# Patient Record
Sex: MALE | Race: WHITE | NOT HISPANIC OR LATINO | Employment: FULL TIME | URBAN - METROPOLITAN AREA
[De-identification: names, ages, dates, MRNs, and addresses within clinical notes are randomized per-mention and may not be internally consistent; named-entity substitution may affect disease eponyms.]

---

## 2017-10-06 ENCOUNTER — HOSPITAL ENCOUNTER (EMERGENCY)
Facility: CLINIC | Age: 52
Discharge: LEFT BEFORE DISCHARGE | End: 2017-10-06
Attending: EMERGENCY MEDICINE
Payer: COMMERCIAL

## 2017-10-06 ENCOUNTER — NURSE TRIAGE (OUTPATIENT)
Dept: NURSING | Facility: CLINIC | Age: 52
End: 2017-10-06

## 2017-10-06 NOTE — TELEPHONE ENCOUNTER
Reason for Disposition    [1] Looks infected AND [2] large red area (> 2 in. or 5 cm)    Additional Information    Negative: [1] Life-threatening reaction (anaphylaxis) in the past to similar substance (e.g., food, insect bite/sting, chemical, etc.) AND [2] < 2 hours since exposure    Negative: Unresponsive, passed out or very weak    Negative: Swollen tongue    Negative: Difficulty breathing or wheezing    Negative: Sounds like a life-threatening emergency to the triager    Negative: Followed a face injury    Negative: [1] Bee sting AND [2] within last 24 hours    Negative: Insect bite suspected    Negative: Swelling mainly of lip(s)    Negative: Swelling mainly around the eyes    Negative: [1] SEVERE swelling of entire face AND [2] < 2 hours since exposure to high-risk allergen (e.g., peanuts, tree nuts, fish, shellfish or 1st dose of drug) AND [3] no serious symptoms AND [4] no serious allergic reaction in the past    Negative: Fever     Left cheek swelling.    Negative: Taking an ACE Inhibitor medication  (e.g., benazepril/LOTENSIN, captopril/CAPOTEN, enalapril/VASOTEC, lisinopril/ZESTRIL)    Negative: Patient sounds very sick or weak to the triager    Negative: Pregnant > 20 weeks    Negative: Postpartum (i.e. < 1 month since delivery)    Negative: SEVERE swelling of the entire face    Negative: [1] Swelling is red AND [2] very painful to touch    Negative: Swelling began after taking a drug    Protocols used: FACE SWELLING-ADULT-

## 2017-10-18 ASSESSMENT — ACTIVITIES OF DAILY LIVING (ADL)
ARE_THERE_FIREARMS_IN_YOUR_HOME?: N
DO_MEMBERS_OF_YOUR_HOUSEHOLD_USE_SUNSCREEN?: Y
DO_MEMBERS_OF_YOUR_HOUSEHOLD_USE_SAFETY_HELMETS?: Y
ARE_THERE_SMOKE_DETECTORS_IN_YOUR_HOME?: Y
DO_MEMBERS_OF_YOUR_HOUSEHOLD_WEAR_SEAT_BELTS?: Y
ARE_THERE_CARBON_MONOXIDE_DETECTORS_IN_YOUR_HOME?: Y

## 2017-11-01 ENCOUNTER — OFFICE VISIT (OUTPATIENT)
Dept: INTERNAL MEDICINE | Facility: CLINIC | Age: 52
End: 2017-11-01

## 2017-11-01 VITALS
HEIGHT: 71 IN | SYSTOLIC BLOOD PRESSURE: 126 MMHG | BODY MASS INDEX: 26.38 KG/M2 | DIASTOLIC BLOOD PRESSURE: 81 MMHG | HEART RATE: 86 BPM | WEIGHT: 188.4 LBS

## 2017-11-01 DIAGNOSIS — K21.9 GASTROESOPHAGEAL REFLUX DISEASE WITHOUT ESOPHAGITIS: ICD-10-CM

## 2017-11-01 DIAGNOSIS — I10 BENIGN ESSENTIAL HYPERTENSION: ICD-10-CM

## 2017-11-01 DIAGNOSIS — I10 ESSENTIAL HYPERTENSION, BENIGN: ICD-10-CM

## 2017-11-01 DIAGNOSIS — I10 BENIGN ESSENTIAL HYPERTENSION: Primary | ICD-10-CM

## 2017-11-01 LAB
ANION GAP SERPL CALCULATED.3IONS-SCNC: 5 MMOL/L (ref 3–14)
BUN SERPL-MCNC: 18 MG/DL (ref 7–30)
CALCIUM SERPL-MCNC: 8.4 MG/DL (ref 8.5–10.1)
CHLORIDE SERPL-SCNC: 105 MMOL/L (ref 94–109)
CHOLEST SERPL-MCNC: 129 MG/DL
CO2 SERPL-SCNC: 27 MMOL/L (ref 20–32)
CREAT SERPL-MCNC: 0.92 MG/DL (ref 0.66–1.25)
GFR SERPL CREATININE-BSD FRML MDRD: 87 ML/MIN/1.7M2
GLUCOSE SERPL-MCNC: 84 MG/DL (ref 70–99)
HDLC SERPL-MCNC: 28 MG/DL
LDLC SERPL CALC-MCNC: 91 MG/DL
NONHDLC SERPL-MCNC: 101 MG/DL
POTASSIUM SERPL-SCNC: 4.3 MMOL/L (ref 3.4–5.3)
SODIUM SERPL-SCNC: 138 MMOL/L (ref 133–144)
TRIGL SERPL-MCNC: 51 MG/DL

## 2017-11-01 RX ORDER — LISINOPRIL 20 MG/1
TABLET ORAL
Qty: 90 TABLET | Refills: 3 | Status: SHIPPED | OUTPATIENT
Start: 2017-11-01 | End: 2018-10-30

## 2017-11-01 ASSESSMENT — ENCOUNTER SYMPTOMS
INSOMNIA: 0
HEMATURIA: 0
STIFFNESS: 0
RECTAL PAIN: 0
VOMITING: 0
ALTERED TEMPERATURE REGULATION: 0
POSTURAL DYSPNEA: 0
RECTAL BLEEDING: 0
BRUISES/BLEEDS EASILY: 0
PALPITATIONS: 0
DEPRESSION: 0
WEAKNESS: 0
SMELL DISTURBANCE: 0
SEIZURES: 0
FATIGUE: 0
BLOOD IN STOOL: 0
SWOLLEN GLANDS: 0
DYSPNEA ON EXERTION: 0
DIFFICULTY URINATING: 0
EXTREMITY NUMBNESS: 0
BACK PAIN: 0
ABDOMINAL PAIN: 0
EYE IRRITATION: 0
SINUS PAIN: 0
DYSURIA: 0
FEVER: 0
MUSCLE CRAMPS: 0
TROUBLE SWALLOWING: 0
DECREASED APPETITE: 0
JAUNDICE: 0
HEARTBURN: 0
SLEEP DISTURBANCES DUE TO BREATHING: 0
EYE WATERING: 0
DIARRHEA: 0
SNORES LOUDLY: 0
EYE PAIN: 0
INCREASED ENERGY: 0
ARTHRALGIAS: 0
LEG SWELLING: 0
ORTHOPNEA: 0
MEMORY LOSS: 0
WEIGHT LOSS: 0
TINGLING: 0
BLOATING: 0
CHILLS: 0
NAIL CHANGES: 0
SINUS CONGESTION: 0
HOARSE VOICE: 0
HALLUCINATIONS: 0
NIGHT SWEATS: 0
POLYPHAGIA: 0
LEG PAIN: 0
HYPOTENSION: 0
BOWEL INCONTINENCE: 0
POLYDIPSIA: 0
SYNCOPE: 0
NERVOUS/ANXIOUS: 0
HEMOPTYSIS: 0
FLANK PAIN: 0
POOR WOUND HEALING: 0
EXERCISE INTOLERANCE: 0
NECK MASS: 0
NUMBNESS: 0
HYPERTENSION: 0
RESPIRATORY PAIN: 0
TACHYCARDIA: 0
CLAUDICATION: 0
HEADACHES: 0
CONSTIPATION: 0
SKIN CHANGES: 0
SPUTUM PRODUCTION: 0
DISTURBANCES IN COORDINATION: 0
SORE THROAT: 0
COUGH DISTURBING SLEEP: 0
LOSS OF CONSCIOUSNESS: 0
JOINT SWELLING: 0
NAUSEA: 0
PANIC: 0
TASTE DISTURBANCE: 0
DOUBLE VISION: 0
DECREASED CONCENTRATION: 0
DIZZINESS: 0
TREMORS: 0
MYALGIAS: 0
NECK PAIN: 0
PARALYSIS: 0
SHORTNESS OF BREATH: 0
SPEECH CHANGE: 0
MUSCLE WEAKNESS: 0
WEIGHT GAIN: 0
WHEEZING: 0
COUGH: 0
EYE REDNESS: 0
LIGHT-HEADEDNESS: 0

## 2017-11-01 ASSESSMENT — PAIN SCALES - GENERAL: PAINLEVEL: NO PAIN (0)

## 2017-11-01 NOTE — MR AVS SNAPSHOT
After Visit Summary   11/1/2017    VÍCTOR MACIEL    MRN: [id number]           Patient Information     Date Of Birth          1965        Visit Information        Provider Department      11/1/2017 7:40 AM Garcia Mario MD [facility]        Today's Diagnoses     Benign essential hypertension    -  1    Essential hypertension, benign        Gastroesophageal reflux disease without esophagitis          Care Instructions    Primary Care Center: [phone]     Primary Care Center Medication Refill Request Information:  * Please contact your pharmacy regarding ANY request for medication refills.  ** [facility] Prescription Fax = [phone]  * Please allow 3 business days for routine medication refills.  * Please allow 5 business days for controlled substance medication refills.     Primary Care Center Test Result notification information:  *You will be notified with in 7-10 days of your appointment day regarding the results of your test.  If you are on MyChart you will be notified as soon as the provider has reviewed the results and signed off on them.            Follow-ups after your visit        Who to contact     Please call your clinic at [phone] to:    Ask questions about your health    Make or cancel appointments    Discuss your medicines    Learn about your test results    Speak to your doctor   If you have compliments or concerns about an experience at your clinic, or if you wish to file a complaint, please contact [facility] Patient Relations at [phone] or email us at [email or web address]         Additional Information About Your Visit        MyChart Information     MyChart gives you secure access to your electronic health record. If you see a primary care provider, you can also send messages to your care team and make appointments. If you have questions, please call your primary care clinic.  If you do not have a  "primary care provider, please call [phone] and they will assist you.      [employer] is an electronic gateway that provides easy, online access to your medical records. With [employer], you can request a clinic appointment, read your test results, renew a prescription or communicate with your care team.     To access your existing account, please contact your [facility] or call [phone] for assistance.        Care EveryWhere ID     This is your Care EveryWhere ID. This could be used by other organizations to access your [place] medical records  [id number]        Your Vitals Were     Pulse Height BMI (Body Mass Index)             86 1.803 m (5' 11\") 26.28 kg/m2          Blood Pressure from Last 3 Encounters:   11/01/17 126/81   10/31/16 126/84   10/29/15 124/82    Weight from Last 3 Encounters:   11/01/17 85.5 kg (188 lb 6.4 oz)   10/31/16 85.4 kg (188 lb 3.2 oz)   10/29/15 83 kg (183 lb)                 Today's Medication Changes          These changes are accurate as of: 11/1/17 11:59 PM.  If you have any questions, ask your nurse or doctor.               Start taking these medicines.        Dose/Directions    ranitidine 300 MG tablet   Commonly known as:  ZANTAC   Used for:  Gastroesophageal reflux disease without esophagitis   Started by:  Garcia Mario MD        Dose:  300 mg   Take 1 tablet (300 mg) by mouth At Bedtime   Quantity:  100 tablet   Refills:  3            Where to get your medicines      These medications were sent to [facility] [address] [place] [address] [address] [facility]  [address], [facility] [address]    Hours:  24-hours Phone:  [phone]     lisinopril 20 MG tablet    ranitidine 300 MG tablet                Primary Care Provider Office Phone # Fax #    Yuly TONY Saenz [phone]       [address] [place] [address]  [facility] [address]        Equal Access to Services     FIIRO " GAAR : Hadii devonte frazier rosangela Guerrero, waroqueda luqadaha, qaybta kasha walsh, waxgiuseppe josie hayslime fagandelroyjeancarlos landon. So [facility] [phone].    ATENCIÓN: Si habla español, tiene a choudhury disposición servicios gratuitos de asistencia lingüística. Llame al [phone].    We comply with applicable federal civil rights laws and [place] laws. We do not discriminate on the basis of race, color, national origin, age, disability, sex, sexual orientation, or gender identity.            Thank you!     Thank you for choosing [facility]  for your care. Our goal is always to provide you with excellent care. Hearing back from our patients is one way we can continue to improve our services. Please take a few minutes to complete the written survey that you may receive in the mail after your visit with us. Thank you!             Your Updated Medication List - Protect others around you: Learn how to safely use, store and throw away your medicines at www.disposemymeds.org.          This list is accurate as of: 11/1/17 11:59 PM.  Always use your most recent med list.                   Brand Name Dispense Instructions for use Diagnosis    lisinopril 20 MG tablet    PRINIVIL/ZESTRIL    90 tablet    Take one tab daily.    Essential hypertension, benign       multivitamin per tablet     100    ONE DAILY        ranitidine 300 MG tablet    ZANTAC    100 tablet    Take 1 tablet (300 mg) by mouth At Bedtime    Gastroesophageal reflux disease without esophagitis       vitamin D 1000 UNITS capsule      Take 2 capsules by mouth daily.

## 2017-11-01 NOTE — NURSING NOTE
Chief Complaint   Patient presents with     Physical     pt here for physical     Medication Request     pt would like to discuss medication refill     Rosina Espinoza CMA at 7:36 AM on 11/1/2017.

## 2017-11-01 NOTE — PATIENT INSTRUCTIONS
[facility]: [phone]     [facility] Medication Refill Request Information:  * Please contact your pharmacy regarding ANY request for medication refills.  ** [facility] Prescription Fax = [phone]  * Please allow 3 business days for routine medication refills.  * Please allow 5 business days for controlled substance medication refills.     [facility] Test Result notification information:  *You will be notified with in 7-10 days of your appointment day regarding the results of your test.  If you are on MyChart you will be notified as soon as the provider has reviewed the results and signed off on them.

## 2017-11-01 NOTE — PROGRESS NOTES
PRIMARY CARE CENTER         HPI:       VÍCTOR MACIEL is a 52 year old male with PMHx of HTN who presents for his annual physical exam.     Patient presents with: Physical (pt here for physical) and Medication Request (pt would like to discuss medication refill)    Feels good, had a good year, no major complaints. He is still exercising 3-4 times per week. He checks his blood pressure once per month, and typically it is a little less than 120/80.    He does not have SOB or chest pain on exertion. He is not concerned about any moles, his mood has been good, no abdominal concerns or changes in bowel habits. Works at [employer], professor, and does research in new antivirals. He is eating a healthy diet low in meat and high in vegetables and fuits. Visiting the dentist twice per year. He recently visited his endodontist for a cracked tooth that aggravated a past root canal.     He may have allergies or a cold coming on, he is unsure. He thinks he is getting allergies. He has a bit of a post-nasal drip that is intermittent and chronic. Once in awhile he may have a cough, and he had a little bit of sneezing yesterday. He notices some reflux/heartburn at times with the post-nasal drip. It will particularly occur at night especially if he eats late or in the day time if he drinks too much coffee. He does not take an antacid, but he will take one or two tums once in awhile.     Problem, Medication and Allergy Lists were reviewed and are current.  Patient is an established patient of this clinic.         Review of Systems:   Review of Systems     Constitutional:  Negative for fever, chills, weight loss, weight gain, fatigue, decreased appetite, night sweats, recent stressors, height gain, height loss, post-operative complications, incisional pain, hallucinations, increased energy, hyperactivity and confused.   HENT:  Negative for ear pain, hearing loss, tinnitus, nosebleeds, trouble swallowing, hoarse voice, mouth sores,  sore throat, ear discharge, tooth pain, gum tenderness, taste disturbance, smell disturbance, hearing aid, bleeding gums, dry mouth, sinus pain, sinus congestion and neck mass.    Eyes:  Negative for double vision, pain, redness, eye pain, decreased vision, eye watering, eye bulging, eye dryness, flashing lights, spots, floaters, strabismus, tunnel vision, jaundice and eye irritation.   Respiratory:   Negative for cough, hemoptysis, sputum production, shortness of breath, wheezing, sleep disturbances due to breathing, snores loudly, respiratory pain, dyspnea on exertion, cough disturbing sleep and postural dyspnea.    Cardiovascular:  Negative for chest pain, dyspnea on exertion, palpitations, orthopnea, claudication, leg swelling, fingers/toes turn blue, hypertension, hypotension, syncope, history of heart murmur, chest pain on exertion, chest pain at rest, pacemaker, few scattered varicosities, leg pain, sleep disturbances due to breathing, tachycardia, light-headedness, exercise intolerance and edema.   Gastrointestinal:  Negative for heartburn, nausea, vomiting, abdominal pain, diarrhea, constipation, blood in stool, melena, rectal pain, bloating, hemorrhoids, bowel incontinence, jaundice, rectal bleeding, coffee ground emesis and change in stool.   Genitourinary:  Negative for bladder incontinence, dysuria, urgency, hematuria, flank pain, difficulty urinating, nocturia, voiding less frequently, scrotal pain, ulcerations, penile discharge, male genitourinary complaint and reduced libido.   Musculoskeletal:  Negative for myalgias, back pain, joint swelling, arthralgias, stiffness, muscle cramps, neck pain, bone pain, muscle weakness and fracture.   Skin:  Negative for nail changes, itching, poor wound healing, rash, hair changes, skin changes, acne, warts, poor wound healing, scarring, flaky skin, Raynaud's phenomenon, sensitivity to sunlight and skin thickening.   Neurological:  Negative for dizziness, tingling,  "tremors, speech change, seizures, loss of consciousness, weakness, light-headedness, numbness, headaches, disturbances in coordination, extremity numbness, memory loss, difficulty walking and paralysis.   Endo/Heme:  Negative for anemia, swollen glands and bruises/bleeds easily.   Psychiatric/Behavioral:  Negative for depression, hallucinations, memory loss, decreased concentration, mood swings and panic attacks.    Endocrine:  Negative for altered temperature regulation, polyphagia, polydipsia, unwanted hair growth and change in facial hair.    I have personally reviewed and updated the complete ROS on the day of the visit.           Physical Exam:   /81  Pulse 86  Ht 1.803 m (5' 11\")  Wt 85.5 kg (188 lb 6.4 oz)  BMI 26.28 kg/m2  Body mass index is 26.28 kg/(m^2).  Vitals were reviewed        GENERAL APPEARANCE: healthy, alert and no distress     EYES: PERRL, no scleral icterus     HENT: ear canals and TM's normal and nose and mouth without ulcers or lesions     NECK: normal ROM     RESP: lungs clear to auscultation - no rales, rhonchi or wheezes     CV: regular rates and rhythm, normal S1 S2, no S3 or S4 and no murmur, click or rub     ABDOMEN:  soft, nontender, no HSM or masses and bowel sounds normal     MS: extremities normal- no gross deformities noted, no evidence of inflammation in joints, FROM in all extremities. No swelling in the LE's.     : prostate slightly enlarged, no nodules palpated     SKIN: no suspicious lesions or rashes     NEURO: Normal strength and tone, sensory exam grossly normal, mentation intact and speech normal     PSYCH: mentation appears normal. and affect normal/bright      Results:   No recent laboratory results to review.     Assessment and Plan   VÍCTOR MACIEL is a 52 year old male with PMHx of HTN who presents for his annual physical exam.    #Recurrent post-nasal drip with some symptomatic heart burn  -possible that this patient's post-nasal drip is 2/2 silent GERD " that becomes symptomatic from time to time  -pt will intermittently notice cough  Plan: begin ranitidine 300 mg, once daily; pt instructed to try for a couple of months and then see if it helps    #benign Essential HTN, well-controlled on lisinopril, 20 mg  Plan: continue on lisinopril 20 mg, will provide refill today; BMP check today WNL except Ca, borderline low at 8.4    #Health Maintenance  -He has already had his annual flu vaccine this year  -Had 1st colonoscopy in 2016 that was normal. Next one due 2026.   -lipid profile today shows normal LDL, TG and cholesterol, HDL is low at 28    Options for treatment and follow-up care were reviewed with the patient. VÍCTOR MACIEL engaged in the decision making process and verbalized understanding of the options discussed and agreed with the final plan.    Winnie Oliva MD  Nov 1, 2017    Pt was seen and plan of care discussed with Dr. Mario.       Answers for HPI/ROS submitted by the patient on 10/18/2017   Annual Exam:  Frequency of exercise:: 4-5 days/week  Duration of exercise:: 30-45 minutes    Pt was seen and examined with Dr. Oliva.  I agree with her documentation as noted above.    My additional comments: None    Garcia Mario

## 2018-10-30 DIAGNOSIS — I10 ESSENTIAL HYPERTENSION, BENIGN: ICD-10-CM

## 2018-10-31 RX ORDER — LISINOPRIL 20 MG/1
TABLET ORAL
Qty: 90 TABLET | Refills: 0 | Status: SHIPPED | OUTPATIENT
Start: 2018-10-31 | End: 2018-11-13

## 2018-10-31 NOTE — TELEPHONE ENCOUNTER
Last Clinic Visit: 11/1/2017  [facility]  Future Visit: 11/13/18

## 2018-11-13 ENCOUNTER — OFFICE VISIT (OUTPATIENT)
Dept: INTERNAL MEDICINE | Facility: CLINIC | Age: 53
End: 2018-11-13
Payer: COMMERCIAL

## 2018-11-13 VITALS
WEIGHT: 193.1 LBS | HEART RATE: 90 BPM | SYSTOLIC BLOOD PRESSURE: 119 MMHG | DIASTOLIC BLOOD PRESSURE: 80 MMHG | HEIGHT: 71 IN | BODY MASS INDEX: 27.03 KG/M2

## 2018-11-13 DIAGNOSIS — I10 ESSENTIAL HYPERTENSION, BENIGN: ICD-10-CM

## 2018-11-13 DIAGNOSIS — E78.6 LOW HDL (UNDER 40): ICD-10-CM

## 2018-11-13 DIAGNOSIS — K21.9 GASTROESOPHAGEAL REFLUX DISEASE WITHOUT ESOPHAGITIS: Primary | ICD-10-CM

## 2018-11-13 DIAGNOSIS — K21.9 GASTROESOPHAGEAL REFLUX DISEASE WITHOUT ESOPHAGITIS: ICD-10-CM

## 2018-11-13 LAB
ALBUMIN SERPL-MCNC: 4 G/DL (ref 3.4–5)
ALP SERPL-CCNC: 98 U/L (ref 40–150)
ALT SERPL W P-5'-P-CCNC: 53 U/L (ref 0–70)
ANION GAP SERPL CALCULATED.3IONS-SCNC: 3 MMOL/L (ref 3–14)
AST SERPL W P-5'-P-CCNC: 33 U/L (ref 0–45)
BILIRUB SERPL-MCNC: 0.4 MG/DL (ref 0.2–1.3)
BUN SERPL-MCNC: 18 MG/DL (ref 7–30)
CALCIUM SERPL-MCNC: 8.6 MG/DL (ref 8.5–10.1)
CHLORIDE SERPL-SCNC: 105 MMOL/L (ref 94–109)
CHOLEST SERPL-MCNC: 161 MG/DL
CO2 SERPL-SCNC: 29 MMOL/L (ref 20–32)
CREAT SERPL-MCNC: 0.99 MG/DL (ref 0.66–1.25)
ERYTHROCYTE [DISTWIDTH] IN BLOOD BY AUTOMATED COUNT: 11.9 % (ref 10–15)
GFR SERPL CREATININE-BSD FRML MDRD: 79 ML/MIN/1.7M2
GLUCOSE SERPL-MCNC: 89 MG/DL (ref 70–99)
HCT VFR BLD AUTO: 43.6 % (ref 40–53)
HDLC SERPL-MCNC: 31 MG/DL
HGB BLD-MCNC: 14.6 G/DL (ref 13.3–17.7)
LDLC SERPL CALC-MCNC: 108 MG/DL
MCH RBC QN AUTO: 32.4 PG (ref 26.5–33)
MCHC RBC AUTO-ENTMCNC: 33.5 G/DL (ref 31.5–36.5)
MCV RBC AUTO: 97 FL (ref 78–100)
NONHDLC SERPL-MCNC: 130 MG/DL
PLATELET # BLD AUTO: 240 10E9/L (ref 150–450)
POTASSIUM SERPL-SCNC: 4.3 MMOL/L (ref 3.4–5.3)
PROT SERPL-MCNC: 7.3 G/DL (ref 6.8–8.8)
RBC # BLD AUTO: 4.5 10E12/L (ref 4.4–5.9)
SODIUM SERPL-SCNC: 137 MMOL/L (ref 133–144)
TRIGL SERPL-MCNC: 109 MG/DL
WBC # BLD AUTO: 5.1 10E9/L (ref 4–11)

## 2018-11-13 RX ORDER — OMEPRAZOLE 40 MG/1
40 CAPSULE, DELAYED RELEASE ORAL DAILY
Qty: 100 CAPSULE | Refills: 3 | Status: SHIPPED | OUTPATIENT
Start: 2018-11-13 | End: 2019-11-13

## 2018-11-13 RX ORDER — LISINOPRIL 20 MG/1
TABLET ORAL
Qty: 100 TABLET | Refills: 3 | Status: SHIPPED | OUTPATIENT
Start: 2018-11-13 | End: 2019-11-13

## 2018-11-13 ASSESSMENT — PAIN SCALES - GENERAL: PAINLEVEL: NO PAIN (0)

## 2018-11-13 NOTE — NURSING NOTE
Chief Complaint   Patient presents with     Physical     pt here for physical     Medication Request     pt would like medication refills       Rosina Espinzoa CMA at 7:39 AM on 11/13/2018.

## 2018-11-13 NOTE — MR AVS SNAPSHOT
After Visit Summary   11/13/2018    VÍCTOR MACIEL    MRN: [id number]           Patient Information     Date Of Birth          1965        Visit Information        Provider Department      11/13/2018 7:40 AM Garcia Mario MD [facility]        Today's Diagnoses     Gastroesophageal reflux disease without esophagitis    -  1    Essential hypertension, benign        Low HDL (under 40)          Care Instructions    Primary Care Center Medication Refill Request Information:  * Please contact your pharmacy regarding ANY request for medication refills.  ** [facility] Prescription Fax = [phone]  * Please allow 3 business days for routine medication refills.  * Please allow 5 business days for controlled substance medication refills.     Primary Care Center Test Result notification information:  *You will be notified with in 7-10 days of your appointment day regarding the results of your test.  If you are on MyChart you will be notified as soon as the provider has reviewed the results and signed off on them.    [facility]: [phone]             Follow-ups after your visit        Future tests that were ordered for you today     Open Future Orders        Priority Expected Expires Ordered    Lipid Profile Routine  11/13/2019 11/13/2018    Comprehensive metabolic panel Routine  11/13/2019 11/13/2018    CBC with platelets Routine 11/13/2018 11/27/2018 11/13/2018            Who to contact     Please call your clinic at [phone] to:    Ask questions about your health    Make or cancel appointments    Discuss your medicines    Learn about your test results    Speak to your doctor            Additional Information About Your Visit        MyChart Information     MyChart gives you secure access to your electronic health record. If you see a primary care provider, you can also send messages to your care team and make appointments. If you have questions, please call your  "primary care clinic.  If you do not have a primary care provider, please call [phone] and they will assist you.      [employer] is an electronic gateway that provides easy, online access to your medical records. With [employer], you can request a clinic appointment, read your test results, renew a prescription or communicate with your care team.     To access your existing account, please contact your [facility] or call [phone] for assistance.        Care EveryWhere ID     This is your Care EveryWhere ID. This could be used by other organizations to access your [place] [address] medical records  [id number]        Your Vitals Were     Pulse Height BMI (Body Mass Index)             90 1.81 m (5' 11.26\") 26.74 kg/m2          Blood Pressure from Last 3 Encounters:   11/13/18 119/80   11/01/17 126/81   10/31/16 126/84    Weight from Last 3 Encounters:   11/13/18 87.6 kg (193 lb 1.6 oz)   11/01/17 85.5 kg (188 lb 6.4 oz)   10/31/16 85.4 kg (188 lb 3.2 oz)                 Today's Medication Changes          These changes are accurate as of 11/13/18  8:25 AM.  If you have any questions, ask your nurse or doctor.               Start taking these medicines.        Dose/Directions    omeprazole 40 MG capsule   Commonly known as:  priLOSEC   Used for:  Gastroesophageal reflux disease without esophagitis   Started by:  Garcia Mario MD        Dose:  40 mg   Take 1 capsule (40 mg) by mouth daily   Quantity:  100 capsule   Refills:  3         Stop taking these medicines if you haven't already. Please contact your care team if you have questions.     ranitidine 300 MG tablet   Commonly known as:  ZANTAC   Stopped by:  Garcia Mario MD                Where to get your medicines      [facility] [address] [place] [address] [address] [facility] [place] [address] [place] [address]    Hours:  24-hours Phone:  " [phone]     lisinopril 20 MG tablet    omeprazole 40 MG capsule                Primary Care Provider Office Phone # Fax #    aGrcia Mario [phone]       [address] [place] [id number]        Equal Access to Services     ISIDRO SHELL : Hadii devonte ku hadasho Soomaali, waaxda luqadaha, qaybta kaalmada adeegyada, waxay bradyin haypeteyn katheryn youngjeancarlos landon. So [facility] [phone].    ATENCIÓN: Si habla español, tiene a choudhury disposición servicios gratuitos de asistencia lingüística. [facility] [phone].    We comply with applicable federal civil rights laws and [place] laws. We do not discriminate on the basis of race, color, national origin, age, disability, sex, sexual orientation, or gender identity.            Thank you!     Thank you for choosing [facility]  for your care. Our goal is always to provide you with excellent care. Hearing back from our patients is one way we can continue to improve our services. Please take a few minutes to complete the written survey that you may receive in the mail after your visit with us. Thank you!             Your Updated Medication List - Protect others around you: Learn how to safely use, store and throw away your medicines at www.disposemymeds.org.          This list is accurate as of 11/13/18  8:25 AM.  Always use your most recent med list.                   Brand Name Dispense Instructions for use Diagnosis    lisinopril 20 MG tablet    PRINIVIL/ZESTRIL    100 tablet    Take one tab daily.    Essential hypertension, benign       multivitamin per tablet     100    ONE DAILY        omeprazole 40 MG capsule    priLOSEC    100 capsule    Take 1 capsule (40 mg) by mouth daily    Gastroesophageal reflux disease without esophagitis       vitamin D 1000 units capsule      Take 2 capsules by mouth daily.

## 2018-11-13 NOTE — PATIENT INSTRUCTIONS
[facility] Medication Refill Request Information:  * Please contact your pharmacy regarding ANY request for medication refills.  ** [facility] Prescription Fax = [phone]  * Please allow 3 business days for routine medication refills.  * Please allow 5 business days for controlled substance medication refills.     [facility] Test Result notification information:  *You will be notified with in 7-10 days of your appointment day regarding the results of your test.  If you are on MyChart you will be notified as soon as the provider has reviewed the results and signed off on them.    [facility]: [phone]

## 2018-11-13 NOTE — PROGRESS NOTES
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  He is been doing quite well.  He is working out 3-4 days a week is tolerating this well without chest pain dyspnea or other complaints.  He has had no associated dizziness lightheadedness he said no problems on his lisinopril his blood pressures been under good control.  He is eating a healthy diet with good intake of fruits and vegetables.  Otherwise he has no specific concerns other than reflux.  He is been taking the ranitidine on a somewhat regular basis and noted that this has some improvement in the reflux symptoms but not resolution.  He will still get reflux particularly in the gym when he is exercising.  He has not had any dysphasia or food getting stuck.  He is cut out coffee and this too has helped somewhat regards to the reflux symptoms although he does not enjoy drinking tea as much.  He has never tried a PPI we discussed the risk and benefits of this and he consented to take omeprazole.  Past Medical History:   Diagnosis Date     Hypertension      Past Surgical History:   Procedure Laterality Date     NO HISTORY OF SURGERY       Family History   Problem Relation Age of Onset     Hypertension Father      Alcohol/Drug Father      Diabetes Father      Hypertension Mother      GASTROINTESTINAL DISEASE Brother      franki     Diabetes Paternal Grandfather      Social History     Social History     Marital status:      Spouse name: N/A     Number of children: 2     Years of education: N/A     Occupational History     Microbiologist [employer]     Social History Main Topics     Smoking status: Never Smoker     Smokeless tobacco: Former User     Types: Chew     Quit date: 10/29/2012      Comment: Pt states he chews every once in a while during fishing     Alcohol use 0.0 oz/week     0 Standard drinks or equivalent per week      Comment: 0-1 drinks/day     Drug use: No     Sexual activity: Yes     Partners: Female  "    Other Topics Concern      Service No     Blood Transfusions No     Caffeine Concern No     Occupational Exposure Yes     works with viruses     Hobby Hazards No     Sleep Concern No     Stress Concern Yes     does meditation daily, exercises     Weight Concern No     Special Diet No     Back Care No     Exercise Yes     3x week 60 min weight training/cardio     Bike Helmet Yes     Seat Belt Yes     Self-Exams Yes     Social History Narrative    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  He has an 8 yr. Old son and 5 year old daughter.  Wife is also employed.       Answers for HPI/ROS submitted by the patient on 11/6/2018   General Symptoms: No  Skin Symptoms: No  HENT Symptoms: No  EYE SYMPTOMS: No  HEART SYMPTOMS: No  LUNG SYMPTOMS: No  INTESTINAL SYMPTOMS: No  URINARY SYMPTOMS: No  REPRODUCTIVE SYMPTOMS: No  SKELETAL SYMPTOMS: No  BLOOD SYMPTOMS: No  NERVOUS SYSTEM SYMPTOMS: No  MENTAL HEALTH SYMPTOMS: No    Exam:  /80  Pulse 90  Ht 1.81 m (5' 11.26\")  Wt 87.6 kg (193 lb 1.6 oz)  BMI 26.74 kg/m2  193 lbs 1.6 oz  Physical Exam   The patient is alert, oriented with a clear sensorium.   Skin shows no lesions or rashes and good turgor.   Head is normocephalic and atraumatic.   Eyes show PERRLA with benign optic fundi.   Ears show normal TMs bilaterally.   Mouth shows clear oral mucosa.   Neck shows no nodes, thyromegaly or bruits.   Back is non tender.  Lungs are clear to percussion and auscultation.   Heart shows normal S1 and S2 without murmur or gallop.   Abdomen is soft and nontender without masses or organomegaly.   Genitalia show normal testes. No evidence of inguinal hernia.  Rectal shows small smooth prostate without nodules or masses.  Extremities show no edema and no evidence of active synovitis.   Neurologic examination shows cranial nerves II-XII intact. Motor shows 5/5 strength. Reflexes are symmetric. Cerebellar testing shows normal tandem gait.  Romberg " negative.    ASSESSMENT  1 hypertension well-controlled  2 GERD persistently symptomatic intermittently  3 low HDL needs follow-up    Plan  I am going to have him try omeprazole for 2 months 40 mg daily and then as needed.  Refilled his lisinopril will reassess his labs today have him follow-up in a year sooner immediately if any increased symptoms or problems    This note was completed using Dragon voice recognition software.  Although reviewed after completion, some word and grammatical errors may occur.    Garcia Mario MD  General Internal Medicine  [facility]  [phone]

## 2018-12-27 ENCOUNTER — OFFICE VISIT (OUTPATIENT)
Dept: INTERNAL MEDICINE | Facility: CLINIC | Age: 53
End: 2018-12-27
Payer: COMMERCIAL

## 2018-12-27 VITALS
WEIGHT: 193 LBS | SYSTOLIC BLOOD PRESSURE: 133 MMHG | RESPIRATION RATE: 18 BRPM | HEART RATE: 96 BPM | BODY MASS INDEX: 26.72 KG/M2 | DIASTOLIC BLOOD PRESSURE: 79 MMHG

## 2018-12-27 DIAGNOSIS — L42 PITYRIASIS ROSEA: Primary | ICD-10-CM

## 2018-12-27 ASSESSMENT — PAIN SCALES - GENERAL: PAINLEVEL: NO PAIN (0)

## 2018-12-27 NOTE — NURSING NOTE
Chief Complaint   Patient presents with     Derm Problem     Patient is here to discuss rash on body, duration 2 weeks     Jenna Menon CMA 3:12 PM on 12/27/2018.

## 2018-12-29 NOTE — PROGRESS NOTES
HPI: VÍCTOR MACIEL is a 53 year old male who comes in for evaluation of a trunk rash lasting 2 weeks.  It t started on his back and is mildly pruritic. The lesions have spread slightly to the upper and lower arms and thighs.  The lesions have not faded in color much. He denies any recent illness. He has been applying Eucerin cream and occasionally HCT cream.    Patient Active Problem List   Diagnosis     HTN (hypertension)     CARDIOVASCULAR SCREENING; LDL GOAL LESS THAN 130     Neoplasm of uncertain behavior of skin     Current Outpatient Medications   Medication Sig Dispense Refill     Cholecalciferol (VITAMIN D) 1000 UNITS capsule Take 2 capsules by mouth daily.       lisinopril (PRINIVIL/ZESTRIL) 20 MG tablet Take one tab daily. 100 tablet 3     MULTIVITAMINS OR TABS ONE DAILY 100 3     omeprazole (PRILOSEC) 40 MG capsule Take 1 capsule (40 mg) by mouth daily 100 capsule 3     ALLERGIES: Patient has no known allergies.    PAST MEDICAL HX:   Past Medical History:   Diagnosis Date     Hypertension      PAST SURGICAL HX:   Past Surgical History:   Procedure Laterality Date     NO HISTORY OF SURGERY       FAMILY HX:    Family History   Problem Relation Age of Onset     Hypertension Father      Alcohol/Drug Father      Diabetes Father      Hypertension Mother      Gastrointestinal Disease Brother         franki     Diabetes Paternal Grandfather        IMMUNIZATION HX:   Immunization History   Administered Date(s) Administered     HEPA 05/06/2009, 10/28/2014     Influenza (H1N1) 01/08/2010     Influenza (IIV3) PF 11/02/2012, 10/09/2013, 10/08/2014, 10/01/2015, 09/26/2017, 10/30/2018     Poliovirus, inactivated (IPV) 05/06/2009     TDAP Vaccine (Adacel) 05/06/2009     Tdap (Adacel,Boostrix) 08/25/2010     Typhoid IM 05/06/2009       SOCIAL HX:   Social History     Social History Narrative    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  Wife is also employed.          ROS  Negative ROS.    OBJECTIVE:  /79 (BP Location: Left arm, Patient Position: Sitting, Cuff Size: Adult Large)   Pulse 96   Resp 18   Wt 87.5 kg (193 lb)   BMI 26.72 kg/m     Wt Readings from Last 1 Encounters:   12/27/18 87.5 kg (193 lb)     Constitutional: no distress, comfortable, pleasant   Eyes: anicteric.   Cardiovascular: see VS  Respiratory: no distress.   Musculoskeletal: grossly normal  Skin: Oval, erythematous papules and small plaques are present on the back in a fir tree pattern and on abdomen.  There is a herald patch on the para L2 region.Scattered lighter colored plaques on inner upper arms and thighs.  Neurological: normal speech, no tremor. A and O x 3,  good historian.  Psychological: appropriate mood, good eye contact, normal affect.   .    ASSESSMENT/PLAN:  Pitariasis rosea  Advised of the natural progression and that it is self limited and lesions should dissipate in 6 weeks approx. He can take Benadryl for pruritis if needed.     Total time spent 25 minutes.  More than 50% of the time spent with Mr. MACIEL on counseling / coordinating his care.    Yuly JIMENEZ, CNP

## 2019-11-08 ENCOUNTER — HEALTH MAINTENANCE LETTER (OUTPATIENT)
Age: 54
End: 2019-11-08

## 2019-11-13 ENCOUNTER — OFFICE VISIT (OUTPATIENT)
Dept: INTERNAL MEDICINE | Facility: CLINIC | Age: 54
End: 2019-11-13
Payer: COMMERCIAL

## 2019-11-13 VITALS
BODY MASS INDEX: 25.62 KG/M2 | HEART RATE: 87 BPM | WEIGHT: 183 LBS | OXYGEN SATURATION: 98 % | DIASTOLIC BLOOD PRESSURE: 80 MMHG | RESPIRATION RATE: 16 BRPM | SYSTOLIC BLOOD PRESSURE: 121 MMHG | HEIGHT: 71 IN

## 2019-11-13 DIAGNOSIS — K21.9 GASTROESOPHAGEAL REFLUX DISEASE WITHOUT ESOPHAGITIS: ICD-10-CM

## 2019-11-13 DIAGNOSIS — I10 HYPERTENSION, UNSPECIFIED TYPE: Primary | ICD-10-CM

## 2019-11-13 DIAGNOSIS — I10 ESSENTIAL HYPERTENSION, BENIGN: ICD-10-CM

## 2019-11-13 DIAGNOSIS — I10 HYPERTENSION, UNSPECIFIED TYPE: ICD-10-CM

## 2019-11-13 DIAGNOSIS — E78.6 LOW HDL (UNDER 40): ICD-10-CM

## 2019-11-13 LAB
ANION GAP SERPL CALCULATED.3IONS-SCNC: 4 MMOL/L (ref 3–14)
BUN SERPL-MCNC: 21 MG/DL (ref 7–30)
CALCIUM SERPL-MCNC: 8.8 MG/DL (ref 8.5–10.1)
CHLORIDE SERPL-SCNC: 106 MMOL/L (ref 94–109)
CHOLEST SERPL-MCNC: 162 MG/DL
CO2 SERPL-SCNC: 28 MMOL/L (ref 20–32)
CREAT SERPL-MCNC: 0.9 MG/DL (ref 0.66–1.25)
GFR SERPL CREATININE-BSD FRML MDRD: >90 ML/MIN/{1.73_M2}
GLUCOSE SERPL-MCNC: 89 MG/DL (ref 70–99)
HDLC SERPL-MCNC: 32 MG/DL
LDLC SERPL CALC-MCNC: 116 MG/DL
NONHDLC SERPL-MCNC: 130 MG/DL
POTASSIUM SERPL-SCNC: 4.2 MMOL/L (ref 3.4–5.3)
SODIUM SERPL-SCNC: 138 MMOL/L (ref 133–144)
TRIGL SERPL-MCNC: 70 MG/DL

## 2019-11-13 RX ORDER — LISINOPRIL 20 MG/1
TABLET ORAL
Qty: 100 TABLET | Refills: 3 | Status: SHIPPED | OUTPATIENT
Start: 2019-11-13 | End: 2020-01-30

## 2019-11-13 ASSESSMENT — MIFFLIN-ST. JEOR: SCORE: 1692.21

## 2019-11-13 ASSESSMENT — PAIN SCALES - GENERAL: PAINLEVEL: NO PAIN (0)

## 2019-11-13 NOTE — PATIENT INSTRUCTIONS
[facility] Medication Refill Request Information:  * Please contact your pharmacy regarding ANY request for medication refills.  ** [facility] Prescription Fax = [phone]  * Please allow 3 business days for routine medication refills.  * Please allow 5 business days for controlled substance medication refills.     [facility] Test Result notification information:  *You will be notified with in 7-10 days of your appointment day regarding the results of your test.  If you are on MyChart you will be notified as soon as the provider has reviewed the results and signed off on them.    [facility]: [phone]

## 2019-11-13 NOTE — PROGRESS NOTES
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  He is been doing well.  Tolerating lisinopril well blood pressures been under excellent control.  He is exercising regularly at the [facility] 3 to 4 days a week.  Is a combination of strength training and cardio training.  His diet is healthy high in fruits and vegetables but tends to be low in fat.  He is sleeping well at night.  The omeprazole was not helpful however a regular course of the ranitidine for 6 weeks did not resolve the heartburn.  He reports now that he has an occasional heartburn once or twice a month for which he takes the ranitidine.  Otherwise he is been asymptomatic and doing well.  Past Medical History:   Diagnosis Date     GERD (gastroesophageal reflux disease)      Hypertension      Past Surgical History:   Procedure Laterality Date     NO HISTORY OF SURGERY       Family History   Problem Relation Age of Onset     Hypertension Father      Alcohol/Drug Father      Diabetes Father      Hypertension Mother      Gastrointestinal Disease Brother         franki     Diabetes Paternal Grandfather      Social History     Socioeconomic History     Marital status:      Spouse name: None     Number of children: 2     Years of education: None     Highest education level: None   Occupational History     Occupation: Microbiologist     Employer: [employer]   Social Needs     Financial resource strain: None     Food insecurity:     Worry: None     Inability: None     Transportation needs:     Medical: None     Non-medical: None   Tobacco Use     Smoking status: Never Smoker     Smokeless tobacco: Former User     Types: Chew     Tobacco comment: Pt states he chews every once in a while during fishing   Substance and Sexual Activity     Alcohol use: Yes     Alcohol/week: 0.0 standard drinks     Comment: 0-1 drinks/day     Drug use: No     Sexual activity: Yes     Partners: Female   Lifestyle     Physical  "activity:     Days per week: None     Minutes per session: None     Stress: None   Relationships     Social connections:     Talks on phone: None     Gets together: None     Attends [employer] service: None     Active member of club or organization: None     Attends meetings of clubs or organizations: None     Relationship status: None     Intimate partner violence:     Fear of current or ex partner: None     Emotionally abused: None     Physically abused: None     Forced sexual activity: None   Other Topics Concern      Service No     Blood Transfusions No     Caffeine Concern No     Occupational Exposure Yes     Comment: works with viruses     Hobby Hazards No     Sleep Concern No     Stress Concern Yes     Comment: does meditation daily, exercises     Weight Concern No     Special Diet No     Back Care No     Exercise Yes     Comment: 3x week 60 min weight training/cardio     Bike Helmet Yes     Seat Belt Yes     Self-Exams Yes   Social History Narrative    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  Wife is also employed.       Answers for HPI/ROS submitted by the patient on 10/30/2019   General Symptoms: No  Skin Symptoms: No  HENT Symptoms: No  EYE SYMPTOMS: No  HEART SYMPTOMS: No  LUNG SYMPTOMS: No  INTESTINAL SYMPTOMS: No  URINARY SYMPTOMS: No  REPRODUCTIVE SYMPTOMS: No  SKELETAL SYMPTOMS: No  BLOOD SYMPTOMS: No  NERVOUS SYSTEM SYMPTOMS: No  MENTAL HEALTH SYMPTOMS: No    Exam:  /80   Pulse 87   Resp 16   Ht 1.803 m (5' 11\")   Wt 83 kg (183 lb)   SpO2 98%   BMI 25.52 kg/m    183 lbs 0 oz  Physical Exam   The patient is alert, oriented with a clear sensorium.   Skin shows no lesions or rashes and good turgor.   Head is normocephalic and atraumatic.   Eyes show PERRLA with benign optic fundi.   Ears show normal TMs bilaterally.   Mouth shows clear oral mucosa.   Neck shows no nodes, thyromegaly or bruits.   Back is non tender.  Lungs are " clear to percussion and auscultation.   Heart shows normal S1 and S2 without murmur or gallop.   Abdomen is soft and nontender without masses or organomegaly.   Genitalia show normal testes. No evidence of inguinal hernia.  Rectal shows small smooth prostate without nodules or masses.  Extremities show no edema and no evidence of active synovitis.   Neurologic examination shows cranial nerves II-XII intact. Motor shows 5/5 strength. Reflexes are symmetric. Cerebellar testing shows normal tandem gait.  Romberg negative.      ASSESSMENT  1 hypertension well controlled  2 GERD stable  3 low HDL needs follow-up    Plan  We will reorder his lisinopril check his BMP and his lipids.  We will continue his healthy lifestyle follow-up in a year    This note was completed using Dragon voice recognition software.  Although reviewed after completion, some word and grammatical errors may occur.    Garcia Mario MD  General Internal Medicine  [facility]  [phone]

## 2019-11-20 ENCOUNTER — ALLIED HEALTH/NURSE VISIT (OUTPATIENT)
Dept: INTERNAL MEDICINE | Facility: CLINIC | Age: 54
End: 2019-11-20
Payer: COMMERCIAL

## 2019-11-20 DIAGNOSIS — Z23 NEED FOR ZOSTER VACCINATION: Primary | ICD-10-CM

## 2019-11-20 NOTE — NURSING NOTE
Patient received SHINGRIX (round 1) vaccine. Vaccine was given under the supervision of Dr. Dodge. See immunization list for administration details. Pt was advised to remain in [facility] lobby for 15 minutes in case of an averse reaction. Given by Donna Tomlinson CMA, EMT 10:16 AM 11/20/2019    Pt had a bit of swelling immediately after the SHINGRIX shot. Had the patient wait in the room with me for five minutes. The initial, small welt concentrated to where the shot was given decreased in swelling after 5 minutes. Instructed patient to wait in the lobby for 10 minutes and call us if there was any adverse reaction. Patient stated that they felt good, not experiencing any dizziness or symptoms.     Scheduled pt for Nurse visit on 3/9/19 at 9AM for second round of SHINGRIX.    Donna Tomlinson, EMT at 10:18 AM sign on 11/20/2019

## 2019-11-20 NOTE — PROGRESS NOTES
Pt confirmed with insurance that SHINGRIX would be covered in clinic. Donna Tomlinson, EMT at 10:22 AM sign on 11/20/2019

## 2020-01-30 DIAGNOSIS — I10 ESSENTIAL HYPERTENSION, BENIGN: ICD-10-CM

## 2020-01-30 RX ORDER — LISINOPRIL 20 MG/1
TABLET ORAL
Qty: 100 TABLET | Refills: 3 | Status: SHIPPED | OUTPATIENT
Start: 2020-01-30 | End: 2020-09-23

## 2020-03-09 ENCOUNTER — ALLIED HEALTH/NURSE VISIT (OUTPATIENT)
Dept: INTERNAL MEDICINE | Facility: CLINIC | Age: 55
End: 2020-03-09
Payer: COMMERCIAL

## 2020-03-09 DIAGNOSIS — Z23 NEED FOR ZOSTER VACCINATION: Primary | ICD-10-CM

## 2020-03-09 NOTE — PROGRESS NOTES
VÍCTOR JANELL comes into clinic today at the request of Dr. Mario Ordering Provider for the second dose of the Shingrix vaccination.    Vaccine was administered without any complication. No redness or swelling at the injection site. Patient feels well after administration.    This service provided today was under the supervising provider of the day Dr. Alanis, who was available if needed.    Marcellus Padron, EMT

## 2020-03-09 NOTE — NURSING NOTE
Chief Complaint   Patient presents with     Allied Health Visit     Pt comes in for second round shingles shot      SHANE Reyes at 8:57 AM sign on 3/9/2020    Patient received Shingles (round 2) vaccine. Vaccine was given under the supervision of Dr. Knott. See immunization list for administration details. Pt was advised to remain in [facility] lobby for 15 minutes in case of an averse reaction. Given by Donna Tomlinson CMA, EMT 8:57 AM 3/9/2020

## 2020-09-23 ENCOUNTER — OFFICE VISIT (OUTPATIENT)
Dept: INTERNAL MEDICINE | Facility: CLINIC | Age: 55
End: 2020-09-23
Payer: COMMERCIAL

## 2020-09-23 VITALS
SYSTOLIC BLOOD PRESSURE: 141 MMHG | TEMPERATURE: 97.9 F | BODY MASS INDEX: 26.05 KG/M2 | DIASTOLIC BLOOD PRESSURE: 95 MMHG | HEART RATE: 69 BPM | OXYGEN SATURATION: 98 % | WEIGHT: 186.8 LBS

## 2020-09-23 DIAGNOSIS — B35.4 TINEA CORPORIS: Primary | ICD-10-CM

## 2020-09-23 DIAGNOSIS — I10 ESSENTIAL HYPERTENSION, BENIGN: ICD-10-CM

## 2020-09-23 DIAGNOSIS — K21.9 GASTROESOPHAGEAL REFLUX DISEASE, ESOPHAGITIS PRESENCE NOT SPECIFIED: ICD-10-CM

## 2020-09-23 RX ORDER — LISINOPRIL 20 MG/1
TABLET ORAL
Qty: 100 TABLET | Refills: 3 | Status: SHIPPED | OUTPATIENT
Start: 2020-09-23 | End: 2021-12-06

## 2020-09-23 RX ORDER — KETOCONAZOLE 20 MG/G
CREAM TOPICAL DAILY
Qty: 60 G | Refills: 0 | Status: SHIPPED | OUTPATIENT
Start: 2020-09-23 | End: 2021-12-15

## 2020-09-23 RX ORDER — FLUCONAZOLE 150 MG/1
150 TABLET ORAL DAILY
Qty: 7 TABLET | Refills: 0 | Status: SHIPPED | OUTPATIENT
Start: 2020-09-23 | End: 2020-09-30

## 2020-09-23 ASSESSMENT — PAIN SCALES - GENERAL: PAINLEVEL: NO PAIN (0)

## 2020-09-23 NOTE — PATIENT INSTRUCTIONS
[facility] Phone Number [phone]  [facility] Medication Refill Request Information:  * Please contact your pharmacy regarding ANY request for medication refills.  ** [facility] Prescription Fax = [phone]  * Please allow 3 business days for routine medication refills.  * Please allow 5 business days for controlled substance medication refills.     [facility] Test Result notification information:  *You will be notified with in 7-10 days of your appointment day regarding the results of your test.  If you are on MyChart you will be notified as soon as the provider has reviewed the results and signed off on them.

## 2020-09-24 NOTE — PROGRESS NOTES
"  [facility]         HPI:       BRIAN DOMINGO is a 55 year old male who presents to clinic for: Derm Problem (Discuss itchy rash on skin. )    Brian Domingo is a 55 year old generally healthy man with PMH hypertension well controlled on lisinopril, and GERD for which he uses omeprazole, who presents to the clinic with chief complaint of \"itchy rash.\"  He has noticed the rash since December 2019. Typically, pruritus would precede the apparition of mosquito bite-like lesions. These lesions started in the groin, upper thighs, and lower abdomen, and have also spread to both elbows, both knees, and in the upper neck behind both ears. He has also felt pruritus in the intergluteal cleft and on his buttocks. Pt says that clear fluid sometimes will drain from these lesions, but no pus or blood has. There is some erythema that forms and some tenderness that develops after he scratches the involved areas. 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 He has not recently started a new medicine or a new diet. Since March his family has had a new dog, and since January, they have moved into a new home. He has tried a 1% hydrocortisone cream that has provided some limited relief. A small amount of betamethasone cream also provided some relief, but he only had access to a small quantity. He changed soap, shampoo, and laundry detergent, all without significant improvement in his symptoms. He endorses some mild decrease in energy that he ascribes to the challenging COVID pandemic times, and some calf and thigh cramps, that he relates to physical exertion and that are chronic in nature. He also endorses some \"canker sores.\"  He has a distant history of skin neoplasm that was unconcerning on biopsy, he says. He also has a history of pityriasis rosea that resolved without intervention.  He presents to the clinic " today because his symptoms have not resolved and he would like medical advice.      PMHx:  Past Medical History:   Diagnosis Date     GERD (gastroesophageal reflux disease)      Hypertension        PSHx:  Past Surgical History:   Procedure Laterality Date     NO HISTORY OF SURGERY         FamHx:  Family History   Problem Relation Age of Onset     Hypertension Father      Alcohol/Drug Father      Diabetes Father      Hypertension Mother      Gastrointestinal Disease Brother         franki     Diabetes Paternal Grandfather        Allergies:   No Known Allergies    Meds:    Current Outpatient Medications:      fluconazole (DIFLUCAN) 150 MG tablet, Take 1 tablet (150 mg) by mouth daily for 7 days, Disp: 7 tablet, Rfl: 0     ketoconazole (NIZORAL) 2 % external cream, Apply topically daily, Disp: 60 g, Rfl: 0     lisinopril (ZESTRIL) 20 MG tablet, TAKE 1 TABLET BY MOUTH DAILY, Disp: 100 tablet, Rfl: 3     omeprazole (PRILOSEC) 20 MG DR capsule, Take 1 capsule (20 mg) by mouth daily, Disp: 30 capsule, Rfl: 1     ranitidine (ZANTAC) 300 MG tablet, as needed , Disp: , Rfl:     SocHx:  Social History     Socioeconomic History     Marital status:      Spouse name: None     Number of children: 2     Years of education: None     Highest education level: None   Occupational History     Occupation: Microbiologist     Employer: [employer]   Social Needs     Financial resource strain: None     Food insecurity     Worry: None     Inability: None     Transportation needs     Medical: None     Non-medical: None   Tobacco Use     Smoking status: Never Smoker     Smokeless tobacco: Former User     Types: Chew     Tobacco comment: Pt states he chews every once in a while during fishing   Substance and Sexual Activity     Alcohol use: Yes     Alcohol/week: 0.0 standard drinks     Comment: 0-1 drinks/day     Drug use: No     Sexual activity: Yes     Partners: Female   Lifestyle     Physical activity     Days per week:  None     Minutes per session: None     Stress: None   Relationships     Social connections     Talks on phone: None     Gets together: None     Attends Advent service: None     Active member of club or organization: None     Attends meetings of clubs or organizations: None     Relationship status: None     Intimate partner violence     Fear of current or ex partner: None     Emotionally abused: None     Physically abused: None     Forced sexual activity: None   Other Topics Concern      Service No     Blood Transfusions No     Caffeine Concern No     Occupational Exposure Yes     Comment: works with viruses     Hobby Hazards No     Sleep Concern No     Stress Concern Yes     Comment: does meditation daily, exercises     Weight Concern No     Special Diet No     Back Care No     Exercise Yes     Comment: 3x week 60 min weight training/cardio     Bike Helmet Yes     Seat Belt Yes     Self-Exams Yes   Social History Narrative    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  He has an 8 yr. Old son and 5 year old daughter.  Wife is also employed.         Problem, Medication and Allergy Lists were reviewed and are current.  Patient is an established patient of this clinic.         Review of Systems:     ROS  I have personally reviewed and updated the complete ROS on the day of the visit.           Physical Exam:   BP (!) 141/95   Pulse 69   Temp 97.9  F (36.6  C) (Oral)   Wt 84.7 kg (186 lb 12.8 oz)   SpO2 98%   BMI 26.05 kg/m    Body mass index is 26.05 kg/m .  Vitals were reviewed       GENERAL APPEARANCE: healthy, alert and no distress     EYES: EOMI,  PERRL     HENT: ear canals and TM's normal and nose and mouth without ulcers or lesions     NECK: no adenopathy, no asymmetry, masses, or scars and thyroid normal to palpation     RESP: lungs clear to auscultation - no rales, rhonchi or wheezes     CV: regular rates and rhythm, normal S1 S2, no S3 or S4 and no murmur, click or rub     ABDOMEN:  soft,  nontender, no HSM or masses and bowel sounds normal     MS: extremities normal- no gross deformities noted, no evidence of inflammation in joints, FROM in all extremities.     SKIN: small punctate and macular rash with palpable excoriations and crusted areas in lower abdomen, groin, knees, elbows, and gluteal folds. Dry skin in lower abdomen and damp, discolored skin in gluteal folds. No purulence. No plaques.     NEURO: Normal strength and tone, sensory exam grossly normal, mentation intact and speech normal     PSYCH: mentation appears normal. and affect normal/bright     LYMPHATICS: No cervical adenopathy        Results:     Orders Only on 11/13/2019   Component Date Value Ref Range Status     Sodium 11/13/2019 138  133 - 144 mmol/L Final     Potassium 11/13/2019 4.2  3.4 - 5.3 mmol/L Final     Chloride 11/13/2019 106  94 - 109 mmol/L Final     Carbon Dioxide 11/13/2019 28  20 - 32 mmol/L Final     Anion Gap 11/13/2019 4  3 - 14 mmol/L Final     Glucose 11/13/2019 89  70 - 99 mg/dL Final     Urea Nitrogen 11/13/2019 21  7 - 30 mg/dL Final     Creatinine 11/13/2019 0.90  0.66 - 1.25 mg/dL Final     GFR Estimate 11/13/2019 >90  >60 mL/min/[1.73_m2] Final    Comment: Non  GFR Calc  Starting 12/18/2018, serum creatinine based estimated GFR (eGFR) will be   calculated using the Chronic Kidney Disease Epidemiology Collaboration   (CKD-EPI) equation.       GFR Estimate If Black 11/13/2019 >90  >60 mL/min/[1.73_m2] Final    Comment:  GFR Calc  Starting 12/18/2018, serum creatinine based estimated GFR (eGFR) will be   calculated using the Chronic Kidney Disease Epidemiology Collaboration   (CKD-EPI) equation.       Calcium 11/13/2019 8.8  8.5 - 10.1 mg/dL Final     Cholesterol 11/13/2019 162  <200 mg/dL Final     Triglycerides 11/13/2019 70  <150 mg/dL Final     HDL Cholesterol 11/13/2019 32* >39 mg/dL Final     LDL Cholesterol Calculated 11/13/2019 116* <100 mg/dL Final    Comment: Above  desirable:  100-129 mg/dl  Borderline High:  130-159 mg/dL  High:             160-189 mg/dL  Very high:       >189 mg/dl       Non HDL Cholesterol 11/13/2019 130* <130 mg/dL Final    Comment: Above Desirable:  130-159 mg/dl  Borderline high:  160-189 mg/dl  High:             190-219 mg/dl  Very high:       >219 mg/dl         Lab Results   Component Value Date    WBC 5.1 11/13/2018    WBC 5.0 10/17/2013    WBC 5.8 08/10/2012    HGB 14.6 11/13/2018    HGB 14.8 10/17/2013    HGB 14.9 08/10/2012    HCT 43.6 11/13/2018    HCT 42.9 10/17/2013    HCT 43.3 08/10/2012     11/13/2018     10/17/2013     08/10/2012     11/13/2019     11/13/2018     11/01/2017    POTASSIUM 4.2 11/13/2019    POTASSIUM 4.3 11/13/2018    POTASSIUM 4.3 11/01/2017    CHLORIDE 106 11/13/2019    CHLORIDE 105 11/13/2018    CHLORIDE 105 11/01/2017    CO2 28 11/13/2019    CO2 29 11/13/2018    CO2 27 11/01/2017    BUN 21 11/13/2019    BUN 18 11/13/2018    BUN 18 11/01/2017    CR 0.90 11/13/2019    CR 0.99 11/13/2018    CR 0.92 11/01/2017    GLC 89 11/13/2019    GLC 89 11/13/2018    GLC 84 11/01/2017    AST 33 11/13/2018    AST 39 08/10/2012    ALT 53 11/13/2018    ALT 41 08/10/2012    ALKPHOS 98 11/13/2018    ALKPHOS 98 08/10/2012    BILITOTAL 0.4 11/13/2018    BILITOTAL 0.6 08/10/2012       Assessment and Plan     BRIAN was seen today for derm problem.    Diagnoses and all orders for this visit:    Tinea corporis  -     ketoconazole (NIZORAL) 2 % external cream; Apply topically daily  -     fluconazole (DIFLUCAN) 150 MG tablet; Take 1 tablet (150 mg) by mouth daily for 7 days  -     DERMATOLOGY ADULT REFERRAL; Future    Essential hypertension, benign  -     lisinopril (ZESTRIL) 20 MG tablet; TAKE 1 TABLET BY MOUTH DAILY    Gastroesophageal reflux disease, esophagitis presence not specified  -     omeprazole (PRILOSEC) 20 MG DR capsule; Take 1 capsule (20 mg) by mouth daily      Brian Domingo is a generally healthy  55 year old man whose clinic visit today was triggered by a subacute pruritic rash involving his groin, lower anterior abdomen, elbows, knees, upper neck, and buttocks, but sparing his palms and soles.    1. Tinea corporis  Given patient's presentation, differential diagnosis includes dermatophytosis.   - prescribed topical ketoconazole  - prescribed oral fluconazole, given the systemic involvement  - provided a referral to dermatology, if symptoms do not resolve within 1-2 weeks despite antifungal therapy      2. Hypertension  Patient's supply of lisinopril was low and a refill was ordered    3. GERD  Patient's supply of omeprazole was low and a refill was ordered    4. Healthcare maintenance  Will provide influenza vaccination later in the fall on subsequent office visit or patient will obtain immunization from work     Options for treatment and follow-up care were reviewed with the patient. VÍCTOR MACIEL engaged in the decision making process and verbalized understanding of the options discussed and agreed with the final plan.    Samson Nichole MD PhD  Sep 23, 2020    Pt was seen and plan of care discussed with Dr Mario.   Pt was seen and examined with Dr. Nichole.  I agree with his and her documentation as noted above.    My additional comments: None    Garcia Mario MD

## 2020-10-07 ENCOUNTER — MYC MEDICAL ADVICE (OUTPATIENT)
Dept: INTERNAL MEDICINE | Facility: CLINIC | Age: 55
End: 2020-10-07

## 2020-10-09 RX ORDER — BETAMETHASONE DIPROPIONATE 0.5 MG/G
CREAM TOPICAL 2 TIMES DAILY
Qty: 15 G | Refills: 0 | Status: CANCELLED | OUTPATIENT
Start: 2020-10-09

## 2020-10-21 ENCOUNTER — OFFICE VISIT (OUTPATIENT)
Dept: DERMATOLOGY | Facility: CLINIC | Age: 55
End: 2020-10-21
Payer: COMMERCIAL

## 2020-10-21 DIAGNOSIS — L30.9 ECZEMA, UNSPECIFIED TYPE: Primary | ICD-10-CM

## 2020-10-21 DIAGNOSIS — B35.4 TINEA CORPORIS: ICD-10-CM

## 2020-10-21 PROCEDURE — 99203 OFFICE O/P NEW LOW 30 MIN: CPT | Performed by: DERMATOLOGY

## 2020-10-21 RX ORDER — TACROLIMUS 1 MG/G
OINTMENT TOPICAL 2 TIMES DAILY
Qty: 100 G | Refills: 3 | Status: SHIPPED | OUTPATIENT
Start: 2020-10-21 | End: 2021-01-21

## 2020-10-21 RX ORDER — TRIAMCINOLONE ACETONIDE 1 MG/G
OINTMENT TOPICAL 2 TIMES DAILY
Qty: 454 G | Refills: 11 | Status: SHIPPED | OUTPATIENT
Start: 2020-10-21 | End: 2021-07-21

## 2020-10-21 ASSESSMENT — PAIN SCALES - GENERAL: PAINLEVEL: NO PAIN (0)

## 2020-10-21 NOTE — NURSING NOTE
Dermatology Rooming Note    VÍCTOR MACIEL's goals for this visit include:   Chief Complaint   Patient presents with     Derm Problem     Marlon is here today to be seen for tinea corpus.      TERA Kenney

## 2020-10-21 NOTE — PATIENT INSTRUCTIONS
- Begin applying topical Triamcinolone twice daily to affected areas on body. Up to 2 weeks at a time to avoid side effects of skin thinning.   - For body folds or sensitive areas (buttocks, groin, face), begin applying Protopic twice daily.   - Please apply a good moisturizer to all skin like Cera-Ve, Tanesha-cream, or Cetaphil. It is best to apply this right after bathing to lock in moisture.

## 2020-10-21 NOTE — PROGRESS NOTES
[facility] Dermatology Note      Dermatology Problem List:  1. Eczematous dermatitis. Ddx atopic dermatitis versus psoriasis/inverse psoriasis. TAC BID to body, Protopic to face, buttocks, groin.     Encounter Date: Oct 21, 2020    CC: rash  Chief Complaint   Patient presents with     Derm Problem     Marlon is here today to be seen for tinea corpus.      HPI:  Mr. VÍCTOR MACIEL is a 55 year old male who presents to clinic today as a new patient for evaluation of itchy rash. Itchiness started in the groin, first noticed in December, without rash at that time initially. Itchiness then spread to thighs, elbows, face and there is sometimes rash in those areas. Will bleed when scratched. Started oral fluconazole 4 weeks ago, and another azole ointment x 2 weeks not sure if it helped. Still experiencing itchiness, and rashes come and go. He does feel like it may be worse while sitting for long periods and notices some minor improvements since using standing desk at work.     Past Medical, Social, Family History:   Patient Active Problem List   Diagnosis     HTN (hypertension)     CARDIOVASCULAR SCREENING; LDL GOAL LESS THAN 130     Neoplasm of uncertain behavior of skin     GERD (gastroesophageal reflux disease)     Past Medical History:   Diagnosis Date     GERD (gastroesophageal reflux disease)      Hypertension      Past Surgical History:   Procedure Laterality Date     NO HISTORY OF SURGERY       Family History   Problem Relation Age of Onset     Hypertension Father      Alcohol/Drug Father      Diabetes Father      Hypertension Mother      Gastrointestinal Disease Brother         franki     Diabetes Paternal Grandfather      Social History:  Patient  reports that he has never smoked. He quit smokeless tobacco use about 7 years ago.  His smokeless tobacco use included chew. He reports current alcohol use. He reports that he does not use drugs.    Medications:  Current Outpatient Medications    Medication Sig Dispense Refill     lisinopril (ZESTRIL) 20 MG tablet TAKE 1 TABLET BY MOUTH DAILY 100 tablet 3     omeprazole (PRILOSEC) 20 MG DR capsule Take 1 capsule (20 mg) by mouth daily 30 capsule 1     tacrolimus (PROTOPIC) 0.1 % external ointment Apply topically 2 times daily 100 g 3     triamcinolone (KENALOG) 0.1 % external ointment Apply topically 2 times daily 454 g 11     ketoconazole (NIZORAL) 2 % external cream Apply topically daily 60 g 0        Allergies:  No Known Allergies    ROS:  Constitutional: Otherwise feeling well today, in usual state of health.   Skin: As per HPI     Physical exam:  Vitals: There were no vitals taken for this visit.  GEN: This is a well developed, well-nourished male in no acute distress, in a pleasant mood.    PULM: Breathing comfortably in no distress  CV: Well-perfused, no cyanosis  EXTREMITIES: No deformity, no edema  SKIN:   Total skin excluding the undergarment areas was performed. The exam included the head/face, neck, both arms, chest, back, abdomen, both legs, digits and/or nails.   - There are pink scaly patches and plaques on the elbows, anterior thighs, and posterior buttocks crease.   - No other lesions of concern on areas examined.     ASSESSMENT/PLAN:    # Papulosquamous eruption. Ddx atopic dermatitis versus psoriasis / inverse psoriasis.   - Begin applying topical Triamcinolone twice daily to affected areas on body up to 2 weeks at a time to avoid side effects of skin thinning.   - For body folds or sensitive areas (buttocks, groin, face), begin applying Protopic twice daily.   - Please apply a good moisturizer to all skin like Cera-Ve, Tanesha-cream, or Cetaphil. It is best to apply this right after bathing to lock in moisture.    CC Garcia Mario MD  [address] [place] [address] on close of this encounter.    Follow-up in 3 months virtually, earlier for new or changing lesions.     Patient was staffed with Dr. Vinay Su,  MD  Dermatology Resident    Staff Physician Comments:   I saw and evaluated the patient with the resident and I agree with the assessment and plan.  I was present for the examination.    Aren Mclean MD  Pronouns: he/him/his    Department of Dermatology  [facility]: Phone: [phone], Fax:[phone]  [facility]: Phone: [phone] Fax: [phone]

## 2020-10-21 NOTE — LETTER
10/21/2020       RE: VÍCTOR MACIEL  [address]  [place] [address]     Dear Colleague,    Thank you for referring your patient, VÍCTOR MACIEL, to the [facility] [place] [facility]. Please see a copy of my visit note below.    [facility] Dermatology Note      Dermatology Problem List:  1. Eczematous dermatitis. Ddx atopic dermatitis versus psoriasis/inverse psoriasis. TAC BID to body, Protopic to face, buttocks, groin.     Encounter Date: Oct 21, 2020    CC: rash  Chief Complaint   Patient presents with     Derm Problem     Marlon is here today to be seen for tinea corpus.      HPI:  Mr. VÍCTOR MACIEL is a 55 year old male who presents to clinic today as a new patient for evaluation of itchy rash. Itchiness started in the groin, first noticed in December, without rash at that time initially. Itchiness then spread to thighs, elbows, face and there is sometimes rash in those areas. Will bleed when scratched. Started oral fluconazole 4 weeks ago, and another azole ointment x 2 weeks not sure if it helped. Still experiencing itchiness, and rashes come and go. He does feel like it may be worse while sitting for long periods and notices some minor improvements since using standing desk at work.     Past Medical, Social, Family History:   Patient Active Problem List   Diagnosis     HTN (hypertension)     CARDIOVASCULAR SCREENING; LDL GOAL LESS THAN 130     Neoplasm of uncertain behavior of skin     GERD (gastroesophageal reflux disease)     Past Medical History:   Diagnosis Date     GERD (gastroesophageal reflux disease)      Hypertension      Past Surgical History:   Procedure Laterality Date     NO HISTORY OF SURGERY       Family History   Problem Relation Age of Onset     Hypertension Father      Alcohol/Drug Father      Diabetes Father      Hypertension Mother      Gastrointestinal Disease Brother         franki     Diabetes  Paternal Grandfather      Social History:  Patient  reports that he has never smoked. He quit smokeless tobacco use about 7 years ago.  His smokeless tobacco use included chew. He reports current alcohol use. He reports that he does not use drugs.    Medications:  Current Outpatient Medications   Medication Sig Dispense Refill     lisinopril (ZESTRIL) 20 MG tablet TAKE 1 TABLET BY MOUTH DAILY 100 tablet 3     omeprazole (PRILOSEC) 20 MG DR capsule Take 1 capsule (20 mg) by mouth daily 30 capsule 1     tacrolimus (PROTOPIC) 0.1 % external ointment Apply topically 2 times daily 100 g 3     triamcinolone (KENALOG) 0.1 % external ointment Apply topically 2 times daily 454 g 11     ketoconazole (NIZORAL) 2 % external cream Apply topically daily 60 g 0        Allergies:  No Known Allergies    ROS:  Constitutional: Otherwise feeling well today, in usual state of health.   Skin: As per HPI     Physical exam:  Vitals: There were no vitals taken for this visit.  GEN: This is a well developed, well-nourished male in no acute distress, in a pleasant mood.    PULM: Breathing comfortably in no distress  CV: Well-perfused, no cyanosis  EXTREMITIES: No deformity, no edema  SKIN:   Total skin excluding the undergarment areas was performed. The exam included the head/face, neck, both arms, chest, back, abdomen, both legs, digits and/or nails.   - There are pink scaly patches and plaques on the elbows, anterior thighs, and posterior buttocks crease.   - No other lesions of concern on areas examined.     ASSESSMENT/PLAN:    # Papulosquamous eruption. Ddx atopic dermatitis versus psoriasis / inverse psoriasis.   - Begin applying topical Triamcinolone twice daily to affected areas on body up to 2 weeks at a time to avoid side effects of skin thinning.   - For body folds or sensitive areas (buttocks, groin, face), begin applying Protopic twice daily.   - Please apply a good moisturizer to all skin like Cera-Ve, Tanesha-cream, or Cetaphil. It  is best to apply this right after bathing to lock in moisture.    CC Garcia Mario MD  [address] [place] [address] on close of this encounter.    Follow-up in 3 months virtually, earlier for new or changing lesions.     Patient was staffed with Dr. Vinay Su MD  Dermatology Resident    Staff Physician Comments:   I saw and evaluated the patient with the resident and I agree with the assessment and plan.  I was present for the examination.    Aren Mclean MD  Pronouns: he/him/his    Department of Dermatology  [facility]: Phone: [phone], Fax:[phone]  [facility]: Phone: [phone] Fax: [phone]

## 2020-10-23 ENCOUNTER — TELEPHONE (OUTPATIENT)
Dept: DERMATOLOGY | Facility: CLINIC | Age: 55
End: 2020-10-23

## 2020-10-23 NOTE — TELEPHONE ENCOUNTER
Prior Authorization Retail Medication Request    Medication/Dose: tacrolimus (PROTOPIC) 0.1 % external ointment  ICD code (if different than what is on RX):  Eczema, unspecified type [L30.9]  Previously Tried and Failed:  See chart  Rationale:      Insurance Name:  [employer]ICA  Insurance ID:  [id number]      Falk:[id number]

## 2020-10-27 NOTE — TELEPHONE ENCOUNTER
Central Prior Authorization Team   Phone: [phone]      PA Initiation    Medication: tacrolimus (PROTOPIC) 0.1 % external ointment-PA initiated  Insurance Company: [employer] - Phone [phone] Fax [phone]  Pharmacy Filling the Rx: [id number] [place], [place] - [address] AT [facility] [address]  Filling Pharmacy Phone: [phone]  Filling Pharmacy Fax:    Start Date: 10/27/2020

## 2020-10-29 NOTE — TELEPHONE ENCOUNTER
Prior Authorization Approval    Authorization Effective Date: 10/21/2020  Authorization Expiration Date: 10/21/2021  Medication: tacrolimus (PROTOPIC) 0.1 % external ointment-PA approved  Approved Dose/Quantity:   Reference #: [id number]   Insurance Company: [employer] - Phone [phone] Fax [phone]  Expected CoPay:       CoPay Card Available:      Foundation Assistance Needed:    Which Pharmacy is filling the prescription (Not needed for infusion/clinic administered): [id number] [place], [place] - [facility] [address] [facility]  Pharmacy Notified: Yes  Patient Notified: No-Pharmacy will contact

## 2020-12-06 ENCOUNTER — HEALTH MAINTENANCE LETTER (OUTPATIENT)
Age: 55
End: 2020-12-06

## 2020-12-08 DIAGNOSIS — K21.00 GASTROESOPHAGEAL REFLUX DISEASE WITH ESOPHAGITIS: Primary | ICD-10-CM

## 2021-01-15 ENCOUNTER — HEALTH MAINTENANCE LETTER (OUTPATIENT)
Age: 56
End: 2021-01-15

## 2021-01-18 ENCOUNTER — MYC MEDICAL ADVICE (OUTPATIENT)
Dept: DERMATOLOGY | Facility: CLINIC | Age: 56
End: 2021-01-18

## 2021-01-21 ENCOUNTER — VIRTUAL VISIT (OUTPATIENT)
Dept: DERMATOLOGY | Facility: CLINIC | Age: 56
End: 2021-01-21
Payer: COMMERCIAL

## 2021-01-21 ENCOUNTER — TELEPHONE (OUTPATIENT)
Dept: DERMATOLOGY | Facility: CLINIC | Age: 56
End: 2021-01-21

## 2021-01-21 DIAGNOSIS — L30.9 ECZEMA, UNSPECIFIED TYPE: ICD-10-CM

## 2021-01-21 DIAGNOSIS — L73.9 FOLLICULITIS: Primary | ICD-10-CM

## 2021-01-21 PROCEDURE — 99213 OFFICE O/P EST LOW 20 MIN: CPT | Mod: GQ | Performed by: DERMATOLOGY

## 2021-01-21 RX ORDER — TACROLIMUS 1 MG/G
OINTMENT TOPICAL 2 TIMES DAILY
Qty: 100 G | Refills: 5 | Status: SHIPPED | OUTPATIENT
Start: 2021-01-21 | End: 2021-07-21

## 2021-01-21 ASSESSMENT — PAIN SCALES - GENERAL: PAINLEVEL: NO PAIN (0)

## 2021-01-21 NOTE — LETTER
1/21/2021       RE: VÍCTOR MACIEL  [address]  [facility] [address]     Dear Colleague,    Thank you for referring your patient, VÍCTOR MACIEL, to the [facility] DERMATOLOGY CLINIC [place] at [facility]. Please see a copy of my visit note below.    [facility] Dermatology Note  Encounter Date: Jan 21, 2021  Store-and-Forward and Telephone ([phone]). Location of teledermatologist: [facility]ERMATOLOG[facility] [place].  Start time: 11:38 AM. End time: 11:50 AM.    Dermatology Problem List:  1. Eczematous dermatitis. Ddx atopic dermatitis versus psoriasis/inverse psoriasis. TAC BID to body, Protopic to face, buttocks, groin.   ____________________________________________    Assessment & Plan:     # Eczematous/irritant dermatitis, face, extremities, groin area. Possibly with mixed folliculitis in buttock/groin.   - Recommended continuing diligent moisturization  - For face, can use protopic 0.1% ointment BID PRN  - For body, can use triam 0.1% ointment BID PRN  - For groin area, can use triam 0.1% ointment BID x 5-7 days, then switch to protopic 0.1% ointment  - As below for folliculitis    #Folliculitis, groin.   - Start benzoyl peroxide 5% wash in the shower daily for possible associated folliculitis on the buttock/groin.     Procedures Performed:    None    Follow-up: 6 month(s) in-person, or earlier for new or changing lesions    Staff:     Aren Mclean MD  Pronouns: he/him/his    Department of Dermatology  [facility]: Phone: [phone], Fax:[phone]  [facility]: Phone: [phone] Fax: [phone]  ____________________________________________    CC: Derm Problem (Marlon is being seen today for a 3 month follow up )      HPI:  Mr. VÍCTOR MACIEL is a(n) 55 year old male who presents today as a  "return patient for eczematous dermatitis on face, extremities, groin area c/w irritant/eczematous dermatitis. He was started on topical therapies.     Today, patient reports things are much improved from prior, including near complete resolution of the rash on his face and elbows/thighs. On those areas, has been using CeraVe moisturizer, triamcinolone 0.1% ointment for body and protopic 0.1% ointment for the face. He states continues to have involvement of rash in the genital area, including occasionally more \"pimple-like\" spots on the buttock. He has been using protopic 0.1% ointment which is helpful, but does not resolve things like ointments on other areas of the body    Patient is otherwise feeling well, without additional concerns.    Labs:  NA    Physical Exam:  Vitals: There were no vitals taken for this visit.  SKIN: Teledermatology photos were reviewed; image quality and interpretability: acceptable. Image date: see upload date.  - Face and upper extremities relatively clear; mild hyperkeratosis on bilateral elbows.   - No other lesions of concern on areas examined.              "

## 2021-01-21 NOTE — NURSING NOTE
Dermatology Rooming Note    VÍCTOR MACIEL's goals for this visit include:   Chief Complaint   Patient presents with     Derm Problem     Marlon is being seen today for a 3 month follow up      TERA Bhakta

## 2021-01-21 NOTE — PATIENT INSTRUCTIONS
[facility] Dermatology Visit    Thank you for allowing us to participate in your care. Your findings, instructions and follow-up plan are as follows:     Dermatitis/eczema.     1.Continue diligent moisturization.   2. For buttock, can start using benzoyl peroxide wash in the shower. Recommended 4-5% strength. This is available over-the-counter ([employer], Loop App).   3. For other areas in groin area, can use triamcinolone 0.1% ointment up to 5-7 days, then switch back to protopic 0.1% ointment.   4. Continue other topical therapies.   5. Follow-up in 6 months.     When should I call my doctor?    If you are worsening or not improving, please, contact us or seek urgent care as noted below.     Who should I call with questions (adults)?    [facility] (adult and pediatric): [phone]     [facility] (adult): [phone]    For urgent needs outside of business hours call the [facility] at [phone] and ask for the dermatology resident on call    If this is a medical emergency and you are unable to reach an ER, Call [phone]      Who should I call with questions (pediatric)?  [facility]- Pediatric Dermatology  Dr. Lulu Gauthier, Dr. Emerson Melendez, Dr. Sonia Corbett, Angela Gtz, FILI Araujo, Dr. Lauren Newman & Dr. Samson Dumas  Non Urgent  Nurse Triage Line; [phone]- Beatriz and June MOTT Care Coordinators   Leighann (/Complex ) [phone]    If you need a prescription refill, please contact your pharmacy. Refills are approved or denied by our Physicians during normal business hours, Monday through Fridays  Per office policy, refills will not be granted if you have not been seen within the past year (or sooner depending on your child's condition)    Scheduling Information:  Pediatric Appointment Scheduling and Call Center [phone]  Radiology  Scheduling- [phone]  Sedation Unit Scheduling- [phone]  [facility] Scheduling- General [phone]; Pediatric Dermatology [phone]  Main  Services: [phone]  East Timorese: [phone]  Finnish: [phone]  Hmong/Armenian/Garry: [phone]  Preadmission Nursing Department Fax Number: [phone] (Fax all pre-operative paperwork to this number)    For urgent matters arising during evenings, weekends, or holidays that cannot wait for normal business hours please call [phone] and ask for the Dermatology Resident On-Call to be paged.

## 2021-01-21 NOTE — TELEPHONE ENCOUNTER
I called and left a VM asking for Marlon to give us a call miguel to schedule a 6 month follow up.    TERA Bhakta

## 2021-01-21 NOTE — PROGRESS NOTES
[facility] Dermatology Note  Encounter Date: Jan 21, 2021  Store-and-Forward and Telephone ([phone]). Location of teledermatologist: [facility] DERMATOLOGY [facility]IN[facility] [place].  Start time: 11:38 AM. End time: 11:50 AM.    Dermatology Problem List:  1. Eczematous dermatitis. Ddx atopic dermatitis versus psoriasis/inverse psoriasis. TAC BID to body, Protopic to face, buttocks, groin.   ____________________________________________    Assessment & Plan:     # Eczematous/irritant dermatitis, face, extremities, groin area. Possibly with mixed folliculitis in buttock/groin.   - Recommended continuing diligent moisturization  - For face, can use protopic 0.1% ointment BID PRN  - For body, can use triam 0.1% ointment BID PRN  - For groin area, can use triam 0.1% ointment BID x 5-7 days, then switch to protopic 0.1% ointment  - As below for folliculitis    #Folliculitis, groin.   - Start benzoyl peroxide 5% wash in the shower daily for possible associated folliculitis on the buttock/groin.     Procedures Performed:    None    Follow-up: 6 month(s) in-person, or earlier for new or changing lesions    Staff:     Aren Mclean MD  Pronouns: he/him/his    Department of Dermatology  [facility]: Phone: [phone], Fax:[phone]  [facility]: Phone: [phone] Fax: [phone]  ____________________________________________    CC: Derm Problem (Marlon is being seen today for a 3 month follow up )      HPI:  Mr. VÍCTOR MACIEL is a(n) 55 year old male who presents today as a return patient for eczematous dermatitis on face, extremities, groin area c/w irritant/eczematous dermatitis. He was started on topical therapies.     Today, patient reports things are much improved from prior, including near complete resolution of the rash on his face and elbows/thighs. On those areas,  "has been using CeraVe moisturizer, triamcinolone 0.1% ointment for body and protopic 0.1% ointment for the face. He states continues to have involvement of rash in the genital area, including occasionally more \"pimple-like\" spots on the buttock. He has been using protopic 0.1% ointment which is helpful, but does not resolve things like ointments on other areas of the body    Patient is otherwise feeling well, without additional concerns.    Labs:  NA    Physical Exam:  Vitals: There were no vitals taken for this visit.  SKIN: Teledermatology photos were reviewed; image quality and interpretability: acceptable. Image date: see upload date.  - Face and upper extremities relatively clear; mild hyperkeratosis on bilateral elbows.   - No other lesions of concern on areas examined.            "

## 2021-03-01 DIAGNOSIS — I10 ESSENTIAL HYPERTENSION, BENIGN: ICD-10-CM

## 2021-03-01 RX ORDER — LISINOPRIL 20 MG/1
TABLET ORAL
Qty: 100 TABLET | Refills: 3 | Status: CANCELLED | OUTPATIENT
Start: 2021-03-01

## 2021-03-02 NOTE — TELEPHONE ENCOUNTER
Call to [facility] pharmacy after receiving paper refill request, message left of prescription having been sent 9/23/20 for 100 tabs with 3 refills, receipt confirmed by pharmacy, no further authorization should be needed at this time

## 2021-07-21 ENCOUNTER — OFFICE VISIT (OUTPATIENT)
Dept: DERMATOLOGY | Facility: CLINIC | Age: 56
End: 2021-07-21
Payer: COMMERCIAL

## 2021-07-21 DIAGNOSIS — L82.1 SEBORRHEIC KERATOSIS: ICD-10-CM

## 2021-07-21 DIAGNOSIS — L30.9 DERMATITIS: Primary | ICD-10-CM

## 2021-07-21 PROCEDURE — 99213 OFFICE O/P EST LOW 20 MIN: CPT | Performed by: DERMATOLOGY

## 2021-07-21 RX ORDER — TRIAMCINOLONE ACETONIDE 1 MG/G
OINTMENT TOPICAL 2 TIMES DAILY
Qty: 454 G | Refills: 11 | Status: SHIPPED | OUTPATIENT
Start: 2021-07-21

## 2021-07-21 RX ORDER — TACROLIMUS 1 MG/G
OINTMENT TOPICAL 2 TIMES DAILY
Qty: 100 G | Refills: 5 | Status: SHIPPED | OUTPATIENT
Start: 2021-07-21

## 2021-07-21 ASSESSMENT — PAIN SCALES - GENERAL: PAINLEVEL: NO PAIN (0)

## 2021-07-21 NOTE — LETTER
7/21/2021       RE: VÍCTOR MACIEL  [address]  [facility] [address]     Dear Colleague,    Thank you for referring your patient, VÍCTOR MACIEL, to the [facility] DERMATOLOGY CLINIC [place] at [facility]. Please see a copy of my visit note below.    [facility] Dermatology Note  Encounter Date: Jul 21, 2021  Office Visit     Dermatology Problem List:  #. P[place]quam eruption. Ddx atopic dermatitis versus psoriasis/inverse psoriasis. TAC BID to body, Protopic to face, buttocks, groin.   ____________________________________________    Assessment & Plan:    #. Papulosquamous eruption, face, extremities, groin area. Favor psoriasis. Chronic, stable / improved. Discussed current regimen, all of which the patient has been using and feels are effective.   - Continue Protopic 0.1% ointment bid prn for face and groin, refill provided  - Continue triamcinolone 0.1% ointment bid prn for body, refill provided  - Continue diligent moisturizing    #. Seborrheic keratoses. Benign nature discussed. Given asymptomatic nature of lesions on right thigh and forehead/temples, no treatment indicated today.    Procedures Performed:   None    Follow-up: 1 year or earlier for new or changing lesions    Staff and Scribe:     Provider Disclosure:   The documentation recorded by the scribe accurately reflects the services I personally performed and the decisions made by me.    Aren Mclean MD    Department of Dermatology  [facility]: Phone: [phone], Fax:[phone]  [facility]: Phone: [phone] Fax: [phone]          Scribe Disclosure:  I, Concepcion Méndez, am serving as a scribe to document services personally performed by Aren Mclean MD based on data collection and the provider's statements to me.    ____________________________________________    CC: Derm Problem (Marlon is here today in order to follow up on folliculitis and eczematous dermatitis. He states that the folliculitis has greatly improved since his last visit. He futher states that he has not have a psoriatic flare up for about 6 months. )      HPI:  Mr. VÍCTOR MACIEL is a(n) 55 year old male who presents today as a return patient for follow up.     The patient was last evaluated by me on 1/21/2021 via telephone, at which time he was advised to use protopic ointment and triamcinolone ointment bid prn for treatment of eczematous dermatitis.     Today, the patient reports that his skin has been much better since his last appointment. He states that his most active area in the last 6 months has been along his jaw line, though when he does have flare ups both of his topical ointments are effective in resolving these quickly.     The patient does note that he had been putting the topical treatments on his elbows during flare ups, however he then developed redness and swelling around both elbows. He has since avoided using either ointment on his elbows and has not had any other problems. He also states that he occasionally has a small amount of itching in his groin, but this is mostly resolved and well controlled with BPO wash.     Patient is otherwise feeling well, without additional skin concerns. He emphasizes that his current regimen is effective and he is pleased with his skin currently.    Labs Reviewed:  N/A    Physical Exam:  Vitals: There were no vitals taken for this visit.  SKIN: Focused examination of face, arms, and legs was performed.  - mild pink erythematous plaques on the elbows   - There are waxy stuck on tan to brown papules on the right thigh and forehead/temples.   - No other lesions of concern on areas examined.     Medications:  Current Outpatient Medications   Medication     lisinopril (ZESTRIL) 20 MG tablet     omeprazole  (PRILOSEC) 20 MG DR capsule     tacrolimus (PROTOPIC) 0.1 % external ointment     triamcinolone (KENALOG) 0.1 % external ointment     ketoconazole (NIZORAL) 2 % external cream     No current facility-administered medications for this visit.        Past Medical History:   Patient Active Problem List   Diagnosis     HTN (hypertension)     CARDIOVASCULAR SCREENING; LDL GOAL LESS THAN 130     Neoplasm of uncertain behavior of skin     GERD (gastroesophageal reflux disease)     Past Medical History:   Diagnosis Date     GERD (gastroesophageal reflux disease)      Hypertension

## 2021-07-21 NOTE — NURSING NOTE
Dermatology Rooming Note    VÍCTOR MACIEL's goals for this visit include:   Chief Complaint   Patient presents with     Derm Problem     Marlon is here today in order to follow up on folliculitis and eczematous dermatitis. He states that the folliculitis has greatly improved since his last visit. He futher states that he has not have a psoriatic flare up for about 6 months.      Elieser Welch, EMT

## 2021-07-21 NOTE — PROGRESS NOTES
[facility] Dermatology Note  Encounter Date: Jul 21, 2021  Office Visit     Dermatology Problem List:  #. Papsquam eruption. Ddx atopic dermatitis versus psoriasis/inverse psoriasis. TAC BID to body, Protopic to face, buttocks, groin.   ____________________________________________    Assessment & Plan:    #. Papulosquamous eruption, face, extremities, groin area. Favor psoriasis. Chronic, stable / improved. Discussed current regimen, all of which the patient has been using and feels are effective.   - Continue Protopic 0.1% ointment bid prn for face and groin, refill provided  - Continue triamcinolone 0.1% ointment bid prn for body, refill provided  - Continue diligent moisturizing    #. Seborrheic keratoses. Benign nature discussed. Given asymptomatic nature of lesions on right thigh and forehead/temples, no treatment indicated today.    Procedures Performed:   None    Follow-up: 1 year or earlier for new or changing lesions    Staff and Scribe:     Provider Disclosure:   The documentation recorded by the scribe accurately reflects the services I personally performed and the decisions made by me.    Aren Mclean MD    Department of Dermatology  [facility]: Phone: [phone], Fax:[phone]  [facility]: Phone: [phone] Fax: [phone]          Scribe Disclosure:  I, Concepcion Méndez, am serving as a scribe to document services personally performed by Aren Mclean MD based on data collection and the provider's statements to me.   ____________________________________________    CC: Derm Problem (Marlon is here today in order to follow up on folliculitis and eczematous dermatitis. He states that the folliculitis has greatly improved since his last visit. He futher states that he has not have a psoriatic flare up for about 6 months. )      HPI:  Mr. VÍCTOR HUBBARD  JANELL is a(n) 55 year old male who presents today as a return patient for follow up.     The patient was last evaluated by me on 1/21/2021 via telephone, at which time he was advised to use protopic ointment and triamcinolone ointment bid prn for treatment of eczematous dermatitis.     Today, the patient reports that his skin has been much better since his last appointment. He states that his most active area in the last 6 months has been along his jaw line, though when he does have flare ups both of his topical ointments are effective in resolving these quickly.     The patient does note that he had been putting the topical treatments on his elbows during flare ups, however he then developed redness and swelling around both elbows. He has since avoided using either ointment on his elbows and has not had any other problems. He also states that he occasionally has a small amount of itching in his groin, but this is mostly resolved and well controlled with BPO wash.     Patient is otherwise feeling well, without additional skin concerns. He emphasizes that his current regimen is effective and he is pleased with his skin currently.    Labs Reviewed:  N/A    Physical Exam:  Vitals: There were no vitals taken for this visit.  SKIN: Focused examination of face, arms, and legs was performed.  - mild pink erythematous plaques on the elbows   - There are waxy stuck on tan to brown papules on the right thigh and forehead/temples.   - No other lesions of concern on areas examined.     Medications:  Current Outpatient Medications   Medication     lisinopril (ZESTRIL) 20 MG tablet     omeprazole (PRILOSEC) 20 MG DR capsule     tacrolimus (PROTOPIC) 0.1 % external ointment     triamcinolone (KENALOG) 0.1 % external ointment     ketoconazole (NIZORAL) 2 % external cream     No current facility-administered medications for this visit.        Past Medical History:   Patient Active Problem List   Diagnosis     HTN (hypertension)      CARDIOVASCULAR SCREENING; LDL GOAL LESS THAN 130     Neoplasm of uncertain behavior of skin     GERD (gastroesophageal reflux disease)     Past Medical History:   Diagnosis Date     GERD (gastroesophageal reflux disease)      Hypertension

## 2021-09-25 ENCOUNTER — HEALTH MAINTENANCE LETTER (OUTPATIENT)
Age: 56
End: 2021-09-25

## 2021-12-03 ENCOUNTER — MYC MEDICAL ADVICE (OUTPATIENT)
Dept: INTERNAL MEDICINE | Facility: CLINIC | Age: 56
End: 2021-12-03
Payer: COMMERCIAL

## 2021-12-03 DIAGNOSIS — I10 ESSENTIAL HYPERTENSION, BENIGN: ICD-10-CM

## 2021-12-06 RX ORDER — LISINOPRIL 20 MG/1
TABLET ORAL
Qty: 30 TABLET | Refills: 0 | Status: SHIPPED | OUTPATIENT
Start: 2021-12-06 | End: 2021-12-15

## 2021-12-06 ASSESSMENT — ENCOUNTER SYMPTOMS
NAIL CHANGES: 0
SKIN CHANGES: 0
POOR WOUND HEALING: 0

## 2021-12-15 ENCOUNTER — OFFICE VISIT (OUTPATIENT)
Dept: INTERNAL MEDICINE | Facility: CLINIC | Age: 56
End: 2021-12-15
Payer: COMMERCIAL

## 2021-12-15 VITALS
DIASTOLIC BLOOD PRESSURE: 89 MMHG | WEIGHT: 194 LBS | TEMPERATURE: 98.1 F | SYSTOLIC BLOOD PRESSURE: 136 MMHG | OXYGEN SATURATION: 96 % | BODY MASS INDEX: 27.16 KG/M2 | HEIGHT: 71 IN | RESPIRATION RATE: 16 BRPM | HEART RATE: 82 BPM

## 2021-12-15 DIAGNOSIS — E78.6 LOW HDL (UNDER 40): ICD-10-CM

## 2021-12-15 DIAGNOSIS — I10 ESSENTIAL HYPERTENSION, BENIGN: ICD-10-CM

## 2021-12-15 DIAGNOSIS — I10 HYPERTENSION, UNSPECIFIED TYPE: Primary | ICD-10-CM

## 2021-12-15 PROCEDURE — 90715 TDAP VACCINE 7 YRS/> IM: CPT | Performed by: INTERNAL MEDICINE

## 2021-12-15 PROCEDURE — 99396 PREV VISIT EST AGE 40-64: CPT | Mod: 25 | Performed by: INTERNAL MEDICINE

## 2021-12-15 PROCEDURE — 90471 IMMUNIZATION ADMIN: CPT | Performed by: INTERNAL MEDICINE

## 2021-12-15 RX ORDER — LISINOPRIL 20 MG/1
TABLET ORAL
Qty: 90 TABLET | Refills: 3 | Status: SHIPPED | OUTPATIENT
Start: 2021-12-15 | End: 2022-12-19

## 2021-12-15 ASSESSMENT — MIFFLIN-ST. JEOR: SCORE: 1732.11

## 2021-12-15 ASSESSMENT — PAIN SCALES - GENERAL: PAINLEVEL: NO PAIN (0)

## 2021-12-15 NOTE — NURSING NOTE
Chief Complaint   Patient presents with     Physical     Patient comes in for a physical exam.         Marcellus Padron MA on 12/15/2021 at 5:55 PM

## 2021-12-15 NOTE — PROGRESS NOTES
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  Is been doing well.  He has exercising in his home gym on a regular basis.  Is tolerating this well and is asymptomatic.  He is eating healthy sleeping well uses a healthy amount of alcohol.  Said no problems on his present medications.  He does report that if he goes off the omeprazole for a month or more he will frequently experience recurrent symptoms which resolved when he restarts this.  Otherwise no symptoms or complaints today.  His rash was reviewed by dermatology felt to be eczematous and appears to be responding to triamcinolone on the body and tacrolimus on the face.  Past Medical History:   Diagnosis Date     GERD (gastroesophageal reflux disease)      Hypertension      Past Surgical History:   Procedure Laterality Date     NO HISTORY OF SURGERY       Family History   Problem Relation Age of Onset     Hypertension Father      Alcohol/Drug Father      Diabetes Father      Hypertension Mother      Gastrointestinal Disease Brother         franki     Diabetes Paternal Grandfather      Answers for HPI/ROS submitted by the patient on 12/6/2021  General Symptoms: No  Skin Symptoms: Yes  HENT Symptoms: No  EYE SYMPTOMS: No  HEART SYMPTOMS: No  LUNG SYMPTOMS: No  INTESTINAL SYMPTOMS: No  URINARY SYMPTOMS: No  REPRODUCTIVE SYMPTOMS: No  SKELETAL SYMPTOMS: No  BLOOD SYMPTOMS: No  NERVOUS SYSTEM SYMPTOMS: No  MENTAL HEALTH SYMPTOMS: No  Changes in hair: No  Changes in moles/birth marks: No  Itching: Yes  Rashes: No  Changes in nails: No  Acne: No  Change in facial hair: No  Warts: No  Non-healing sores: No  Scarring: No  Flaking of skin: No  Color changes of hands/feet in cold : No  Sun sensitivity: No  Skin thickening: No        Exam:  /89 (BP Location: Right arm, Patient Position: Sitting, Cuff Size: Adult Large)   Pulse 82   Temp 98.1  F (36.7  C) (Oral)   Resp 16   Ht 1.803 m (5' 11\")   Wt 88 kg (194 lb)   SpO2 96%   BMI 27.06 " kg/m    194 lbs 0 oz  Physical Exam   The patient is alert, oriented with a clear sensorium.   Skin shows no lesions or rashes and good turgor.   Head is normocephalic and atraumatic.   Eyes show PERRLA with benign optic fundi.   Ears show normal TMs bilaterally.   Mouth shows clear oral mucosa.   Neck shows no nodes, thyromegaly or bruits.   Back is non tender.  Lungs are clear to percussion and auscultation.   Heart shows normal S1 and S2 without murmur or gallop.   Abdomen is soft and nontender without masses or organomegaly.   Genitalia show normal testes. No evidence of inguinal hernia.  Rectal shows small smooth prostate without nodules or masses.  Extremities show no edema and no evidence of active synovitis.   Neurologic examination shows cranial nerves II-XII intact. Motor shows 5/5 strength. Reflexes are symmetric. Cerebellar testing shows normal tandem gait.  Romberg negative.      ASSESSMENT  1 hypertension fair control  2 GERD stable  3 low HDL needs follow-up  4 atopic dermatitis    Plan  Regular continue on the present medications follow-up with fasting labs and update his immunizations with a flu shot otherwise he is up-to-date on all immunizations      This note was completed using Dragon voice recognition software.      Garcia Mario MD  General Internal Medicine  [facility]  [phone]

## 2021-12-21 ENCOUNTER — LAB (OUTPATIENT)
Dept: LAB | Facility: CLINIC | Age: 56
End: 2021-12-21
Attending: INTERNAL MEDICINE
Payer: COMMERCIAL

## 2021-12-21 DIAGNOSIS — E78.6 LOW HDL (UNDER 40): ICD-10-CM

## 2021-12-21 DIAGNOSIS — I10 HYPERTENSION, UNSPECIFIED TYPE: ICD-10-CM

## 2021-12-21 LAB
ANION GAP SERPL CALCULATED.3IONS-SCNC: 4 MMOL/L (ref 3–14)
BUN SERPL-MCNC: 17 MG/DL (ref 7–30)
CALCIUM SERPL-MCNC: 8.6 MG/DL (ref 8.5–10.1)
CHLORIDE BLD-SCNC: 108 MMOL/L (ref 94–109)
CO2 SERPL-SCNC: 30 MMOL/L (ref 20–32)
CREAT SERPL-MCNC: 0.98 MG/DL (ref 0.66–1.25)
GFR SERPL CREATININE-BSD FRML MDRD: 86 ML/MIN/1.73M2
GLUCOSE BLD-MCNC: 92 MG/DL (ref 70–99)
POTASSIUM BLD-SCNC: 4.3 MMOL/L (ref 3.4–5.3)
SODIUM SERPL-SCNC: 142 MMOL/L (ref 133–144)

## 2021-12-21 PROCEDURE — 80061 LIPID PANEL: CPT | Mod: 90 | Performed by: PATHOLOGY

## 2021-12-21 PROCEDURE — 99000 SPECIMEN HANDLING OFFICE-LAB: CPT | Performed by: PATHOLOGY

## 2021-12-21 PROCEDURE — 36415 COLL VENOUS BLD VENIPUNCTURE: CPT | Performed by: PATHOLOGY

## 2021-12-21 PROCEDURE — 80048 BASIC METABOLIC PNL TOTAL CA: CPT | Performed by: PATHOLOGY

## 2021-12-23 LAB
CHOLEST SERPL-MCNC: 149 MG/DL
FASTING STATUS PATIENT QL REPORTED: YES
HDLC SERPL-MCNC: 28 MG/DL
LDLC SERPL CALC-MCNC: 99 MG/DL
NONHDLC SERPL-MCNC: 121 MG/DL
TRIGL SERPL-MCNC: 110 MG/DL

## 2022-07-01 NOTE — NURSING NOTE
Chief Complaint   Patient presents with     Physical     Fany Negrete LPN at 7:03 AM on 11/13/2019.    
VÍCTOR MACIEL      1.  Has the patient received the information for the influenza vaccine? YES    2.  Does the patient have any of the following contraindications?     Allergy to eggs? No     Allergic reaction to previous influenza vaccines? No     Any other problems to previous influenza vaccines? No     Paralyzed by Guillain-[place] syndrome? No     Currently pregnant? NO     Current moderate or severe illness? No     Allergy to contact lens solution? No    3.  The vaccine has been administered in the usual fashion and the patient was instructed to wait 20 minutes before leaving the building in the event of an allergic reaction: YES    Vaccination given by Fany Negrete LPN at 7:04 AM on 11/13/2019.  .  Recorded by Fany Negrete LPN      
Romina Beckman RN

## 2022-12-12 ASSESSMENT — ENCOUNTER SYMPTOMS
NECK MASS: 0
INSOMNIA: 0
TASTE DISTURBANCE: 0
SMELL DISTURBANCE: 0
SKIN CHANGES: 0
NAIL CHANGES: 0
DECREASED CONCENTRATION: 0
SORE THROAT: 0
TROUBLE SWALLOWING: 0
POOR WOUND HEALING: 0
PANIC: 0
SINUS CONGESTION: 0
DEPRESSION: 0
NERVOUS/ANXIOUS: 1
HOARSE VOICE: 0
SINUS PAIN: 1

## 2022-12-19 ENCOUNTER — LAB (OUTPATIENT)
Dept: LAB | Facility: CLINIC | Age: 57
End: 2022-12-19
Payer: COMMERCIAL

## 2022-12-19 ENCOUNTER — OFFICE VISIT (OUTPATIENT)
Dept: INTERNAL MEDICINE | Facility: CLINIC | Age: 57
End: 2022-12-19
Payer: COMMERCIAL

## 2022-12-19 VITALS
BODY MASS INDEX: 27.15 KG/M2 | SYSTOLIC BLOOD PRESSURE: 138 MMHG | OXYGEN SATURATION: 95 % | DIASTOLIC BLOOD PRESSURE: 89 MMHG | HEART RATE: 81 BPM | HEIGHT: 71 IN | WEIGHT: 193.9 LBS

## 2022-12-19 DIAGNOSIS — I10 ESSENTIAL HYPERTENSION, BENIGN: ICD-10-CM

## 2022-12-19 DIAGNOSIS — K21.9 GASTROESOPHAGEAL REFLUX DISEASE WITHOUT ESOPHAGITIS: Primary | ICD-10-CM

## 2022-12-19 DIAGNOSIS — E78.6 LOW HDL (UNDER 40): ICD-10-CM

## 2022-12-19 LAB
ANION GAP SERPL CALCULATED.3IONS-SCNC: 8 MMOL/L (ref 7–15)
BUN SERPL-MCNC: 16.3 MG/DL (ref 6–20)
CALCIUM SERPL-MCNC: 8.9 MG/DL (ref 8.6–10)
CHLORIDE SERPL-SCNC: 106 MMOL/L (ref 98–107)
CHOLEST SERPL-MCNC: 139 MG/DL
CREAT SERPL-MCNC: 0.88 MG/DL (ref 0.67–1.17)
DEPRECATED HCO3 PLAS-SCNC: 27 MMOL/L (ref 22–29)
GFR SERPL CREATININE-BSD FRML MDRD: >90 ML/MIN/1.73M2
GLUCOSE SERPL-MCNC: 94 MG/DL (ref 70–99)
HDLC SERPL-MCNC: 29 MG/DL
LDLC SERPL CALC-MCNC: 95 MG/DL
NONHDLC SERPL-MCNC: 110 MG/DL
POTASSIUM SERPL-SCNC: 4.4 MMOL/L (ref 3.4–5.3)
SODIUM SERPL-SCNC: 141 MMOL/L (ref 136–145)
TRIGL SERPL-MCNC: 74 MG/DL

## 2022-12-19 PROCEDURE — 99396 PREV VISIT EST AGE 40-64: CPT | Mod: 25 | Performed by: INTERNAL MEDICINE

## 2022-12-19 PROCEDURE — 91312 COVID-19 VACCINE BIVALENT BOOSTER 12+ (PFIZER): CPT | Performed by: INTERNAL MEDICINE

## 2022-12-19 PROCEDURE — 0124A COVID-19 VACCINE BIVALENT BOOSTER 12+ (PFIZER): CPT | Performed by: INTERNAL MEDICINE

## 2022-12-19 PROCEDURE — 80048 BASIC METABOLIC PNL TOTAL CA: CPT | Performed by: PATHOLOGY

## 2022-12-19 PROCEDURE — 80061 LIPID PANEL: CPT | Performed by: PATHOLOGY

## 2022-12-19 PROCEDURE — 36415 COLL VENOUS BLD VENIPUNCTURE: CPT | Performed by: PATHOLOGY

## 2022-12-19 RX ORDER — LISINOPRIL 20 MG/1
TABLET ORAL
Qty: 90 TABLET | Refills: 3 | Status: SHIPPED | OUTPATIENT
Start: 2022-12-19 | End: 2023-12-11

## 2022-12-19 NOTE — NURSING NOTE
VÍCTOR MACIEL is a 57 year old male that presents in clinic today for the following:     Chief Complaint   Patient presents with     Physical     Pt here for physical       The patient's allergies and medications were reviewed. The patient's vitals were obtained without incident. The patient does not have any other questions for the provider.     Hiwot Barraza, EMT at 7:01 AM on 12/19/2022.  Primary Care Clinic: [phone]

## 2022-12-19 NOTE — PROGRESS NOTES
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  He has been doing well.  He is back at the [facility] and he is doing intensive workouts 4 times a week and walking on the other days.  He is eating a healthy diet trying to maintain increased fruit vegetable intake.  He sleeps well at night uses a moderate amount of alcohol for 5 days a week couple glasses of wine no tobacco.  He is yet to have his COVID bivalent booster otherwise he is current on his immunizations.  Past Medical History:   Diagnosis Date     GERD (gastroesophageal reflux disease)      Hypertension      Past Surgical History:   Procedure Laterality Date     NO HISTORY OF SURGERY       Family History   Problem Relation Age of Onset     Hypertension Father      Alcohol/Drug Father      Diabetes Father      Hypertension Mother      Gastrointestinal Disease Brother         franki     Diabetes Paternal Grandfather      Answers for HPI/ROS submitted by the patient on 12/12/2022  General Symptoms: No  Skin Symptoms: Yes  HENT Symptoms: Yes  EYE SYMPTOMS: No  HEART SYMPTOMS: No  LUNG SYMPTOMS: No  INTESTINAL SYMPTOMS: No  URINARY SYMPTOMS: No  REPRODUCTIVE SYMPTOMS: No  SKELETAL SYMPTOMS: No  BLOOD SYMPTOMS: No  NERVOUS SYSTEM SYMPTOMS: No  MENTAL HEALTH SYMPTOMS: Yes  Ear pain: Yes  Ear discharge: No  Hearing loss: No  Tinnitus: No  Nosebleeds: No  Congestion: No  Sinus pain: Yes  Trouble swallowing: No   Voice hoarseness: No  Mouth sores: No  Sore throat: No  Tooth pain: No  Gum tenderness: No  Bleeding gums: No  Change in taste: No  Change in sense of smell: No  Dry mouth: No  Hearing aid used: No  Neck lump: No  Changes in hair: No  Changes in moles/birth marks: No  Itching: Yes  Rashes: Yes  Changes in nails: No  Acne: No  Change in facial hair: No  Warts: No  Non-healing sores: No  Scarring: No  Flaking of skin: No  Color changes of hands/feet in cold : No  Sun sensitivity: No  Skin thickening: No  Nervous or Anxious:  "Yes  Depression: No  Trouble sleeping: No  Trouble thinking or concentrating: No  Mood changes: No  Panic attacks: No        Exam:  BP (!) 142/94   Pulse 81   Ht 1.803 m (5' 11\")   Wt 88 kg (193 lb 14.4 oz)   SpO2 95%   BMI 27.04 kg/m    193 lbs 14.4 oz  Physical Exam   The patient is alert, oriented with a clear sensorium.   Skin shows no lesions or rashes and good turgor.   Head is normocephalic and atraumatic.   Eyes show PERRLA with benign optic fundi.   Ears show normal TMs bilaterally.   Mouth shows clear oral mucosa.   Neck shows no nodes, thyromegaly or bruits.   Back is non tender.  Lungs are clear to percussion and auscultation.   Heart shows normal S1 and S2 without murmur or gallop.   Abdomen is soft and nontender without masses or organomegaly.   Genitalia show normal testes. No evidence of inguinal hernia.  Rectal shows small smooth prostate without nodules or masses.  Extremities show no edema and no evidence of active synovitis.   Neurologic examination shows cranial nerves II-XII intact. Motor shows 5/5 strength. Reflexes are symmetric. Cerebellar testing shows normal tandem gait.  Romberg negative.    Labs reviewed:  Results for orders placed or performed in visit on 12/19/22   Basic metabolic panel     Status: Normal   Result Value Ref Range    Sodium 141 136 - 145 mmol/L    Potassium 4.4 3.4 - 5.3 mmol/L    Chloride 106 98 - 107 mmol/L    Carbon Dioxide (CO2) 27 22 - 29 mmol/L    Anion Gap 8 7 - 15 mmol/L    Urea Nitrogen 16.3 6.0 - 20.0 mg/dL    Creatinine 0.88 0.67 - 1.17 mg/dL    Calcium 8.9 8.6 - 10.0 mg/dL    Glucose 94 70 - 99 mg/dL    GFR Estimate >90 >60 mL/min/1.73m2   Lipid Profile     Status: Abnormal   Result Value Ref Range    Cholesterol 139 <200 mg/dL    Triglycerides 74 <150 mg/dL    Direct Measure HDL 29 (L) >=40 mg/dL    LDL Cholesterol Calculated 95 <=100 mg/dL    Non HDL Cholesterol 110 <130 mg/dL    Narrative    Cholesterol  Desirable:  <200 " mg/dL    Triglycerides  Normal:  Less than 150 mg/dL  Borderline High:  150-199 mg/dL  High:  200-499 mg/dL  Very High:  Greater than or equal to 500 mg/dL    Direct Measure HDL  Female:  Greater than or equal to 50 mg/dL   Male:  Greater than or equal to 40 mg/dL    LDL Cholesterol  Desirable:  <100mg/dL  Above Desirable:  100-129 mg/dL   Borderline High:  130-159 mg/dL   High:  160-189 mg/dL   Very High:  >= 190 mg/dL    Non HDL Cholesterol  Desirable:  130 mg/dL  Above Desirable:  130-159 mg/dL  Borderline High:  160-189 mg/dL  High:  190-219 mg/dL  Very High:  Greater than or equal to 220 mg/dL         ASSESSMENT  1 hypertension marginal needs F/U  2 GERD stable on prn omeprazole  3 low HDL   4 atopic dermatitis controlled    Plan  Given the COVID booster today check his BMP and lipids refilled his lisinopril for a year continue to use the omeprazole on a as needed basis for the GERD reassess in a year    This note was completed using Dragon voice recognition software.      Garcia Mario MD  General Internal Medicine  [facility]  [phone]

## 2023-02-02 DIAGNOSIS — K21.9 GASTROESOPHAGEAL REFLUX DISEASE WITHOUT ESOPHAGITIS: Primary | ICD-10-CM

## 2023-12-11 ENCOUNTER — MYC MEDICAL ADVICE (OUTPATIENT)
Dept: INTERNAL MEDICINE | Facility: CLINIC | Age: 58
End: 2023-12-11

## 2023-12-11 ENCOUNTER — LAB (OUTPATIENT)
Dept: LAB | Facility: CLINIC | Age: 58
End: 2023-12-11
Payer: COMMERCIAL

## 2023-12-11 ENCOUNTER — OFFICE VISIT (OUTPATIENT)
Dept: INTERNAL MEDICINE | Facility: CLINIC | Age: 58
End: 2023-12-11
Payer: COMMERCIAL

## 2023-12-11 VITALS
OXYGEN SATURATION: 97 % | HEART RATE: 81 BPM | BODY MASS INDEX: 26.26 KG/M2 | DIASTOLIC BLOOD PRESSURE: 91 MMHG | WEIGHT: 188.3 LBS | SYSTOLIC BLOOD PRESSURE: 141 MMHG

## 2023-12-11 DIAGNOSIS — I10 ESSENTIAL HYPERTENSION, BENIGN: ICD-10-CM

## 2023-12-11 DIAGNOSIS — E78.5 HYPERLIPIDEMIA, UNSPECIFIED HYPERLIPIDEMIA TYPE: Primary | ICD-10-CM

## 2023-12-11 DIAGNOSIS — K21.9 GASTROESOPHAGEAL REFLUX DISEASE WITHOUT ESOPHAGITIS: ICD-10-CM

## 2023-12-11 DIAGNOSIS — E78.6 LOW HDL (UNDER 40): Primary | ICD-10-CM

## 2023-12-11 DIAGNOSIS — E78.6 LOW HDL (UNDER 40): ICD-10-CM

## 2023-12-11 LAB
ANION GAP SERPL CALCULATED.3IONS-SCNC: 8 MMOL/L (ref 7–15)
BUN SERPL-MCNC: 21.1 MG/DL (ref 6–20)
CALCIUM SERPL-MCNC: 8.7 MG/DL (ref 8.6–10)
CHLORIDE SERPL-SCNC: 106 MMOL/L (ref 98–107)
CHOLEST SERPL-MCNC: 143 MG/DL
CREAT SERPL-MCNC: 0.85 MG/DL (ref 0.67–1.17)
DEPRECATED HCO3 PLAS-SCNC: 23 MMOL/L (ref 22–29)
EGFRCR SERPLBLD CKD-EPI 2021: >90 ML/MIN/1.73M2
FASTING STATUS PATIENT QL REPORTED: YES
GLUCOSE SERPL-MCNC: 96 MG/DL (ref 70–99)
HDLC SERPL-MCNC: 26 MG/DL
LDLC SERPL CALC-MCNC: 103 MG/DL
NONHDLC SERPL-MCNC: 117 MG/DL
POTASSIUM SERPL-SCNC: 4.4 MMOL/L (ref 3.4–5.3)
SODIUM SERPL-SCNC: 137 MMOL/L (ref 135–145)
TRIGL SERPL-MCNC: 72 MG/DL

## 2023-12-11 PROCEDURE — 36415 COLL VENOUS BLD VENIPUNCTURE: CPT | Performed by: PATHOLOGY

## 2023-12-11 PROCEDURE — 90686 IIV4 VACC NO PRSV 0.5 ML IM: CPT | Performed by: INTERNAL MEDICINE

## 2023-12-11 PROCEDURE — 90471 IMMUNIZATION ADMIN: CPT | Performed by: INTERNAL MEDICINE

## 2023-12-11 PROCEDURE — 80061 LIPID PANEL: CPT | Performed by: PATHOLOGY

## 2023-12-11 PROCEDURE — 99396 PREV VISIT EST AGE 40-64: CPT | Mod: 25 | Performed by: INTERNAL MEDICINE

## 2023-12-11 PROCEDURE — 80048 BASIC METABOLIC PNL TOTAL CA: CPT | Performed by: PATHOLOGY

## 2023-12-11 RX ORDER — LISINOPRIL 20 MG/1
TABLET ORAL
Qty: 90 TABLET | Refills: 3 | Status: SHIPPED | OUTPATIENT
Start: 2023-12-11

## 2023-12-11 NOTE — PROGRESS NOTES
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  Has been monitoring his blood pressure at home and this is consistently running less than 130/80.  He brings his machine in today and it correlates well with readings here.  He does admit to being anxious and nervous about coming in for his annual visit.  He has excellent health habits.  He is working out regularly although he took a break when he tested positive for COVID a few weeks ago.  Eats a healthy diet with a focus on vegetables.  He sleeps reasonably well despite his stress he has a glass and a half of wine 3-4 nights a week does not smoke.  He is due for a flu shot is current on his other immunizations.  He is using the omeprazole on a as needed basis he is tolerating the lisinopril for blood pressure management.  He has had some pain in the right shoulder after workouts and occasionally while laying on that shoulder at night.  Past Medical History:   Diagnosis Date    GERD (gastroesophageal reflux disease)     Hypertension      Past Surgical History:   Procedure Laterality Date    NO HISTORY OF SURGERY       Family History   Problem Relation Age of Onset    Hypertension Father     Alcohol/Drug Father     Diabetes Father     Hypertension Mother     Gastrointestinal Disease Brother         franki    Diabetes Paternal Grandfather          Exam:  BP (!) 163/101 (BP Location: Right arm, Patient Position: Sitting, Cuff Size: Adult Regular)   Pulse 90   Wt 85.4 kg (188 lb 4.8 oz)   SpO2 97%   BMI 26.26 kg/m    188 lbs 4.8 oz  Physical Exam   The patient is alert, oriented with a clear sensorium.   Skin shows no lesions or rashes and good turgor.   Head is normocephalic and atraumatic.   Eyes show PERRLA with benign optic fundi.   Ears show normal right TM, cerumen on the left.   Mouth shows clear oral mucosa.   Neck shows no nodes, thyromegaly or bruits.   Back is non tender.  Lungs are clear to percussion and auscultation.   Heart  shows normal S1 and S2 without murmur or gallop.   Abdomen is soft and nontender without masses or organomegaly.   Genitalia show normal testes. No evidence of inguinal hernia.  Rectal shows small smooth prostate without nodules or masses.  Extremities show no edema and no evidence of active synovitis.  Full range of motion of the shoulders negative drop arm test good strength with internal and external rotation negative Saldivar but positive Neer impingement signs  Neurologic examination shows cranial nerves II-XII intact. Motor shows 5/5 strength. Reflexes are symmetric. Cerebellar testing shows normal tandem gait.  Romberg negative.    Labs reviewed:  Results for orders placed or performed in visit on 12/11/23   Lipid Profile     Status: Abnormal   Result Value Ref Range    Cholesterol 143 <200 mg/dL    Triglycerides 72 <150 mg/dL    Direct Measure HDL 26 (L) >=40 mg/dL    LDL Cholesterol Calculated 103 (H) <=100 mg/dL    Non HDL Cholesterol 117 <130 mg/dL    Patient Fasting > 8hrs? Yes     Narrative    Cholesterol  Desirable:  <200 mg/dL    Triglycerides  Normal:  Less than 150 mg/dL  Borderline High:  150-199 mg/dL  High:  200-499 mg/dL  Very High:  Greater than or equal to 500 mg/dL    Direct Measure HDL  Female:  Greater than or equal to 50 mg/dL   Male:  Greater than or equal to 40 mg/dL    LDL Cholesterol  Desirable:  <100mg/dL  Above Desirable:  100-129 mg/dL   Borderline High:  130-159 mg/dL   High:  160-189 mg/dL   Very High:  >= 190 mg/dL    Non HDL Cholesterol  Desirable:  130 mg/dL  Above Desirable:  130-159 mg/dL  Borderline High:  160-189 mg/dL  High:  190-219 mg/dL  Very High:  Greater than or equal to 220 mg/dL   Basic metabolic panel     Status: Abnormal   Result Value Ref Range    Sodium 137 135 - 145 mmol/L    Potassium 4.4 3.4 - 5.3 mmol/L    Chloride 106 98 - 107 mmol/L    Carbon Dioxide (CO2) 23 22 - 29 mmol/L    Anion Gap 8 7 - 15 mmol/L    Urea Nitrogen 21.1 (H) 6.0 - 20.0 mg/dL     Creatinine 0.85 0.67 - 1.17 mg/dL    GFR Estimate >90 >60 mL/min/1.73m2    Calcium 8.7 8.6 - 10.0 mg/dL    Glucose 96 70 - 99 mg/dL       ASSESSMENT  1 hypertension with whitecoat syndrome  2 GERD stable on prn omeprazole  3 low HDL   4 atopic dermatitis controlled  5 right shoulder impingement  6 Hyperlipidemia needs rosuvastatin    Plan  Will update his immunizations with the flu shot and will check his BMP and lipids continue to monitor the blood pressure at home.  Given strengthening and stretching program to use for the right shoulder will plan to follow-up in a year      This note was completed using Dragon voice recognition software.      Garcia Mario MD  General Internal Medicine  [facility]  [phone]

## 2023-12-12 RX ORDER — ROSUVASTATIN CALCIUM 5 MG/1
5 TABLET, COATED ORAL DAILY
Qty: 45 TABLET | Refills: 3 | Status: SHIPPED | OUTPATIENT
Start: 2023-12-12 | End: 2024-09-05

## 2024-06-20 ENCOUNTER — MYC MEDICAL ADVICE (OUTPATIENT)
Dept: INTERNAL MEDICINE | Facility: CLINIC | Age: 59
End: 2024-06-20

## 2024-06-20 ENCOUNTER — LAB (OUTPATIENT)
Dept: LAB | Facility: CLINIC | Age: 59
End: 2024-06-20
Payer: COMMERCIAL

## 2024-06-20 DIAGNOSIS — E78.5 HYPERLIPIDEMIA, UNSPECIFIED HYPERLIPIDEMIA TYPE: ICD-10-CM

## 2024-06-20 LAB
CHOLEST SERPL-MCNC: 128 MG/DL
FASTING STATUS PATIENT QL REPORTED: YES
HDLC SERPL-MCNC: 27 MG/DL
LDLC SERPL CALC-MCNC: 87 MG/DL
NONHDLC SERPL-MCNC: 101 MG/DL
TRIGL SERPL-MCNC: 70 MG/DL

## 2024-06-20 PROCEDURE — 36415 COLL VENOUS BLD VENIPUNCTURE: CPT | Performed by: PATHOLOGY

## 2024-06-20 PROCEDURE — 80061 LIPID PANEL: CPT | Performed by: PATHOLOGY

## 2024-07-11 ENCOUNTER — MYC MEDICAL ADVICE (OUTPATIENT)
Dept: INTERNAL MEDICINE | Facility: CLINIC | Age: 59
End: 2024-07-11
Payer: COMMERCIAL

## 2024-09-02 DIAGNOSIS — E78.5 HYPERLIPIDEMIA, UNSPECIFIED HYPERLIPIDEMIA TYPE: ICD-10-CM

## 2024-09-05 RX ORDER — ROSUVASTATIN CALCIUM 5 MG/1
5 TABLET, COATED ORAL DAILY
Qty: 90 TABLET | Refills: 3 | Status: SHIPPED | OUTPATIENT
Start: 2024-09-05

## 2024-09-05 NOTE — TELEPHONE ENCOUNTER
LVD:  12/11/2023  [facility] Internal Medicine [facility], Garcia Javier MD  Internal Medicine     LDL Cholesterol Calculated   Date Value Ref Range Status   06/20/2024 87 <=100 mg/dL Final   11/13/2019 116 (H) <100 mg/dL Final     Comment:     Above desirable:  100-129 mg/dl  Borderline High:  130-159 mg/dL  High:             160-189 mg/dL  Very high:       >189 mg/dl         Refilled per protocol.

## 2024-12-04 DIAGNOSIS — I10 ESSENTIAL HYPERTENSION, BENIGN: ICD-10-CM

## 2024-12-10 RX ORDER — LISINOPRIL 20 MG/1
TABLET ORAL
Qty: 90 TABLET | Refills: 0 | Status: SHIPPED | OUTPATIENT
Start: 2024-12-10

## 2024-12-10 NOTE — TELEPHONE ENCOUNTER
lisinopril (ZESTRIL) 20 MG tablet                        Last Written Prescription Date:  12/11/23  Last Fill Quantity: 90,   # refills: 3  Last Office Visit : 12/11/23  Future Office visit:  12/16/24    90 day james refill given, has upcoming appt, elevated reading noted at visit and he was anxious, readings at home less.   Etta LY RN  [facility]/Red Flag Triage & Med Refill Team

## 2024-12-11 SDOH — HEALTH STABILITY: PHYSICAL HEALTH: ON AVERAGE, HOW MANY DAYS PER WEEK DO YOU ENGAGE IN MODERATE TO STRENUOUS EXERCISE (LIKE A BRISK WALK)?: 4 DAYS

## 2024-12-11 SDOH — HEALTH STABILITY: PHYSICAL HEALTH: ON AVERAGE, HOW MANY MINUTES DO YOU ENGAGE IN EXERCISE AT THIS LEVEL?: 70 MIN

## 2024-12-11 ASSESSMENT — SOCIAL DETERMINANTS OF HEALTH (SDOH): HOW OFTEN DO YOU GET TOGETHER WITH FRIENDS OR RELATIVES?: ONCE A WEEK

## 2024-12-16 ENCOUNTER — LAB (OUTPATIENT)
Dept: LAB | Facility: CLINIC | Age: 59
End: 2024-12-16
Payer: COMMERCIAL

## 2024-12-16 ENCOUNTER — OFFICE VISIT (OUTPATIENT)
Dept: INTERNAL MEDICINE | Facility: CLINIC | Age: 59
End: 2024-12-16
Payer: COMMERCIAL

## 2024-12-16 VITALS
SYSTOLIC BLOOD PRESSURE: 143 MMHG | WEIGHT: 194.1 LBS | DIASTOLIC BLOOD PRESSURE: 91 MMHG | RESPIRATION RATE: 14 BRPM | HEART RATE: 72 BPM | HEIGHT: 72 IN | BODY MASS INDEX: 26.29 KG/M2 | TEMPERATURE: 97.9 F | OXYGEN SATURATION: 96 %

## 2024-12-16 DIAGNOSIS — E83.51 HYPOCALCEMIA: Primary | ICD-10-CM

## 2024-12-16 DIAGNOSIS — E83.51 HYPOCALCEMIA: ICD-10-CM

## 2024-12-16 DIAGNOSIS — K21.9 GASTROESOPHAGEAL REFLUX DISEASE WITHOUT ESOPHAGITIS: ICD-10-CM

## 2024-12-16 DIAGNOSIS — I10 ESSENTIAL HYPERTENSION, BENIGN: ICD-10-CM

## 2024-12-16 DIAGNOSIS — E78.5 HYPERLIPIDEMIA, UNSPECIFIED HYPERLIPIDEMIA TYPE: ICD-10-CM

## 2024-12-16 LAB
ALBUMIN SERPL BCG-MCNC: 4.1 G/DL (ref 3.5–5.2)
ALP SERPL-CCNC: 102 U/L (ref 40–150)
ALT SERPL W P-5'-P-CCNC: 53 U/L (ref 0–70)
ANION GAP SERPL CALCULATED.3IONS-SCNC: 9 MMOL/L (ref 7–15)
AST SERPL W P-5'-P-CCNC: 41 U/L (ref 0–45)
BILIRUB SERPL-MCNC: 0.3 MG/DL
BUN SERPL-MCNC: 16.8 MG/DL (ref 8–23)
CALCIUM SERPL-MCNC: 8.5 MG/DL (ref 8.8–10.4)
CHLORIDE SERPL-SCNC: 108 MMOL/L (ref 98–107)
CHOLEST SERPL-MCNC: 114 MG/DL
CREAT SERPL-MCNC: 0.8 MG/DL (ref 0.67–1.17)
EGFRCR SERPLBLD CKD-EPI 2021: >90 ML/MIN/1.73M2
FASTING STATUS PATIENT QL REPORTED: YES
FASTING STATUS PATIENT QL REPORTED: YES
GLUCOSE SERPL-MCNC: 99 MG/DL (ref 70–99)
HCO3 SERPL-SCNC: 25 MMOL/L (ref 22–29)
HDLC SERPL-MCNC: 30 MG/DL
LDLC SERPL CALC-MCNC: 72 MG/DL
NONHDLC SERPL-MCNC: 84 MG/DL
POTASSIUM SERPL-SCNC: 4.2 MMOL/L (ref 3.4–5.3)
PROT SERPL-MCNC: 6.4 G/DL (ref 6.4–8.3)
SODIUM SERPL-SCNC: 142 MMOL/L (ref 135–145)
TRIGL SERPL-MCNC: 61 MG/DL

## 2024-12-16 PROCEDURE — 80061 LIPID PANEL: CPT | Performed by: PATHOLOGY

## 2024-12-16 PROCEDURE — 82306 VITAMIN D 25 HYDROXY: CPT | Performed by: INTERNAL MEDICINE

## 2024-12-16 PROCEDURE — 80053 COMPREHEN METABOLIC PANEL: CPT | Performed by: PATHOLOGY

## 2024-12-16 PROCEDURE — 99000 SPECIMEN HANDLING OFFICE-LAB: CPT | Performed by: PATHOLOGY

## 2024-12-16 PROCEDURE — 36415 COLL VENOUS BLD VENIPUNCTURE: CPT | Performed by: PATHOLOGY

## 2024-12-16 PROCEDURE — 87338 HPYLORI STOOL AG IA: CPT | Performed by: INTERNAL MEDICINE

## 2024-12-16 RX ORDER — MULTIVITAMIN,THERAPEUTIC
1 TABLET ORAL DAILY
COMMUNITY

## 2024-12-16 RX ORDER — LISINOPRIL 20 MG/1
TABLET ORAL
Qty: 90 TABLET | Refills: 3 | Status: SHIPPED | OUTPATIENT
Start: 2024-12-16

## 2024-12-16 RX ORDER — ROSUVASTATIN CALCIUM 5 MG/1
5 TABLET, COATED ORAL DAILY
Qty: 90 TABLET | Refills: 3 | Status: SHIPPED | OUTPATIENT
Start: 2024-12-16

## 2024-12-16 RX ORDER — CHOLECALCIFEROL (VITAMIN D3) 50 MCG
1 TABLET ORAL DAILY
COMMUNITY

## 2024-12-16 NOTE — PROGRESS NOTES
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  He has been having more issues with his reflux.  He has been having near daily reflux now and having to take the omeprazole on a daily basis.  This is keeping things in check however.  No associated dysphagia.  He is exercising doing a combination of strength training cardiovascular training and stretching.  He has had some intermittent right elbow discomfort in the area of the distal biceps tendon no epicondyle discomfort at all.  He monitors his blood pressure at home he has a number of blood pressure readings all of which are less than 130/80.  He has had his machine validated in the past.  He does admit to being tense for these exams.  He is taking the rosuvastatin and tolerating that well.  He is doing the rotator cuff exercises and his shoulder impingement symptoms have resolved  Past Medical History:   Diagnosis Date    GERD (gastroesophageal reflux disease)     Hypertension      Past Surgical History:   Procedure Laterality Date    NO HISTORY OF SURGERY       Family History   Problem Relation Age of Onset    Hypertension Father     Alcohol/Drug Father     Diabetes Father         adult onset    Hypertension Mother     Gastrointestinal Disease Brother         franki    Diabetes Paternal Grandfather          Exam:  BP (!) 151/95 (BP Location: Right arm, Patient Position: Sitting, Cuff Size: Adult Regular)   Pulse 76   Temp 97.9  F (36.6  C) (Oral)   Resp 14   Ht 1.816 m (5' 11.5\")   Wt 88 kg (194 lb 1.6 oz)   SpO2 96%   BMI 26.69 kg/m    194 lbs 1.6 oz  Physical Exam   The patient is alert, oriented with a clear sensorium.   Skin shows no lesions or rashes and good turgor.   Head is normocephalic and atraumatic.   Eyes show PERRLA with benign optic fundi.   Ears show normal TMs bilaterally.   Mouth shows clear oral mucosa.   Neck shows no nodes, thyromegaly or bruits.   Back is non tender.  Lungs are clear to percussion and auscultation.   Heart " shows normal S1 and S2 without murmur or gallop.   Abdomen is soft and nontender without masses or organomegaly.   Genitalia show normal testes. No evidence of inguinal hernia.  Rectal shows moderate smooth prostate without nodules or masses.  Extremities show no edema and no evidence of active synovitis.  Minimal tenderness in the area of the distal right bicep no epicondyle tenderness  Neurologic examination shows cranial nerves II-XII intact. Motor shows 5/5 strength. Reflexes are symmetric. Cerebellar testing shows normal tandem gait.  Romberg negative.  Labs reviewed:  Results for orders placed or performed in visit on 12/16/24   Comprehensive metabolic panel     Status: Abnormal   Result Value Ref Range    Sodium 142 135 - 145 mmol/L    Potassium 4.2 3.4 - 5.3 mmol/L    Carbon Dioxide (CO2) 25 22 - 29 mmol/L    Anion Gap 9 7 - 15 mmol/L    Urea Nitrogen 16.8 8.0 - 23.0 mg/dL    Creatinine 0.80 0.67 - 1.17 mg/dL    GFR Estimate >90 >60 mL/min/1.73m2    Calcium 8.5 (L) 8.8 - 10.4 mg/dL    Chloride 108 (H) 98 - 107 mmol/L    Glucose 99 70 - 99 mg/dL    Alkaline Phosphatase 102 40 - 150 U/L    AST 41 0 - 45 U/L    ALT 53 0 - 70 U/L    Protein Total 6.4 6.4 - 8.3 g/dL    Albumin 4.1 3.5 - 5.2 g/dL    Bilirubin Total 0.3 <=1.2 mg/dL    Patient Fasting > 8hrs? Yes    Lipid Profile     Status: Abnormal   Result Value Ref Range    Cholesterol 114 <200 mg/dL    Triglycerides 61 <150 mg/dL    Direct Measure HDL 30 (L) >=40 mg/dL    LDL Cholesterol Calculated 72 <100 mg/dL    Non HDL Cholesterol 84 <130 mg/dL    Patient Fasting > 8hrs? Yes     Narrative    Cholesterol  Desirable: < 200 mg/dL  Borderline High: 200 - 239 mg/dL  High: >= 240 mg/dL    Triglycerides  Normal: < 150 mg/dL  Borderline High: 150 - 199 mg/dL  High: 200-499 mg/dL  Very High: >= 500 mg/dL    Direct Measure HDL  Female: >= 50 mg/dL   Male: >= 40 mg/dL    LDL Cholesterol  Desirable: < 100 mg/dL  Above Desirable: 100 - 129 mg/dL   Borderline High: 130 -  159 mg/dL   High:  160 - 189 mg/dL   Very High: >= 190 mg/dL    Non HDL Cholesterol  Desirable: < 130 mg/dL  Above Desirable: 130 - 159 mg/dL  Borderline High: 160 - 189 mg/dL  High: 190 - 219 mg/dL  Very High: >= 220 mg/dL         ASSESSMENT  1 hypertension with whitecoat syndrome  2 GERD on omeprazole  3 mild right distal bicep tendon  4 atopic dermatitis controlled  5 right shoulder impingement resolved with exercise  6 Hyperlipidemia on rosuvastatin  7 Hypocalcemia    Plan  Will reassess his labs today in light of his exenteration and reflux symptoms will check for H. pylori.  We also discussed potential EGD if these are not improving or resolving on the omeprazole.  We due for his colonoscopy next year and we will plan to get an EGD at that time as well.  Renewed his medications plan routine follow-up in a year.  This note was completed using Dragon voice recognition software.      Garcia Mario MD  General Internal Medicine  [facility]  [phone]

## 2024-12-17 LAB
H PYLORI AG STL QL IA: NEGATIVE
VIT D+METAB SERPL-MCNC: 29 NG/ML (ref 20–50)

## 2025-01-05 ENCOUNTER — HEALTH MAINTENANCE LETTER (OUTPATIENT)
Age: 60
End: 2025-01-05

## 2025-03-20 ENCOUNTER — LAB (OUTPATIENT)
Dept: LAB | Facility: CLINIC | Age: 60
End: 2025-03-20
Payer: COMMERCIAL

## 2025-03-20 DIAGNOSIS — E83.51 HYPOCALCEMIA: ICD-10-CM

## 2025-03-20 LAB
ANION GAP SERPL CALCULATED.3IONS-SCNC: 7 MMOL/L (ref 7–15)
BUN SERPL-MCNC: 17.2 MG/DL (ref 8–23)
CALCIUM SERPL-MCNC: 9.1 MG/DL (ref 8.8–10.4)
CHLORIDE SERPL-SCNC: 105 MMOL/L (ref 98–107)
CREAT SERPL-MCNC: 0.91 MG/DL (ref 0.67–1.17)
EGFRCR SERPLBLD CKD-EPI 2021: >90 ML/MIN/1.73M2
GLUCOSE SERPL-MCNC: 94 MG/DL (ref 70–99)
HCO3 SERPL-SCNC: 28 MMOL/L (ref 22–29)
POTASSIUM SERPL-SCNC: 4.7 MMOL/L (ref 3.4–5.3)
PTH-INTACT SERPL-MCNC: 81 PG/ML (ref 15–65)
SODIUM SERPL-SCNC: 140 MMOL/L (ref 135–145)

## 2025-04-22 ENCOUNTER — OFFICE VISIT (OUTPATIENT)
Dept: URGENT CARE | Facility: URGENT CARE | Age: 60
End: 2025-04-22
Payer: COMMERCIAL

## 2025-04-22 VITALS
TEMPERATURE: 98 F | WEIGHT: 194 LBS | RESPIRATION RATE: 16 BRPM | HEART RATE: 93 BPM | HEIGHT: 71 IN | OXYGEN SATURATION: 97 % | DIASTOLIC BLOOD PRESSURE: 90 MMHG | SYSTOLIC BLOOD PRESSURE: 158 MMHG | BODY MASS INDEX: 27.16 KG/M2

## 2025-04-22 DIAGNOSIS — L72.3 SEBACEOUS CYST: Primary | ICD-10-CM

## 2025-04-22 PROCEDURE — 99213 OFFICE O/P EST LOW 20 MIN: CPT | Performed by: FAMILY MEDICINE

## 2025-04-22 PROCEDURE — 3077F SYST BP >= 140 MM HG: CPT | Performed by: FAMILY MEDICINE

## 2025-04-22 PROCEDURE — 3080F DIAST BP >= 90 MM HG: CPT | Performed by: FAMILY MEDICINE

## 2025-04-22 RX ORDER — CEPHALEXIN 500 MG/1
500 CAPSULE ORAL 3 TIMES DAILY
Qty: 30 CAPSULE | Refills: 0 | Status: SHIPPED | OUTPATIENT
Start: 2025-04-22 | End: 2025-05-02

## 2025-04-22 NOTE — PROGRESS NOTES
Urgent Care Clinic Visit    Chief Complaint   Patient presents with    cyst on the back

## 2025-04-22 NOTE — PROGRESS NOTES
"Assessment & Plan     Sebaceous cyst  No amenable to incsion and drain  Hot pack  Keflex  If fluctuant and increased pain than may need I&D  - cephALEXin (KEFLEX) 500 MG capsule  Dispense: 30 capsule; Refill: 0             No follow-ups on file.    Washington Mcfarland MD  [facility]RG[facility]    Chester Mason is a 59 year old male who presents to clinic today for the following health issues:  Chief Complaint   Patient presents with    cyst on the back        HPI    Cyst on back  Lower back  Some pain  No drainage  No fever        Review of Systems        Objective    BP (!) 158/90   Pulse 93   Temp 98  F (36.7  C)   Resp 16   Ht 1.803 m (5' 11\")   Wt 88 kg (194 lb)   SpO2 97%   BMI 27.06 kg/m    Physical Exam  Vitals and nursing note reviewed.   Constitutional:       Appearance: Normal appearance.   Skin:     Comments: Lower back with hard firm red lump lower back  Not fluctuant or near surface.     Neurological:      Mental Status: He is alert.                    "

## 2025-04-26 ENCOUNTER — OFFICE VISIT (OUTPATIENT)
Dept: URGENT CARE | Facility: URGENT CARE | Age: 60
End: 2025-04-26
Payer: COMMERCIAL

## 2025-04-26 VITALS
SYSTOLIC BLOOD PRESSURE: 152 MMHG | OXYGEN SATURATION: 95 % | TEMPERATURE: 97.9 F | HEART RATE: 100 BPM | BODY MASS INDEX: 26.5 KG/M2 | WEIGHT: 190 LBS | DIASTOLIC BLOOD PRESSURE: 98 MMHG

## 2025-04-26 DIAGNOSIS — L02.212 ABSCESS OF BACK: Primary | ICD-10-CM

## 2025-04-26 DIAGNOSIS — T78.40XA ALLERGIC REACTION, INITIAL ENCOUNTER: ICD-10-CM

## 2025-04-26 PROCEDURE — 10060 I&D ABSCESS SIMPLE/SINGLE: CPT | Performed by: FAMILY MEDICINE

## 2025-04-26 PROCEDURE — 3077F SYST BP >= 140 MM HG: CPT | Performed by: FAMILY MEDICINE

## 2025-04-26 PROCEDURE — 3080F DIAST BP >= 90 MM HG: CPT | Performed by: FAMILY MEDICINE

## 2025-04-26 RX ORDER — SULFAMETHOXAZOLE AND TRIMETHOPRIM 800; 160 MG/1; MG/1
1 TABLET ORAL 2 TIMES DAILY
Qty: 14 TABLET | Refills: 0 | Status: SHIPPED | OUTPATIENT
Start: 2025-04-26 | End: 2025-05-03

## 2025-04-26 NOTE — PROGRESS NOTES
Assessment:       Abscess of back  ***  - sulfamethoxazole-trimethoprim (BACTRIM DS) 800-160 MG tablet  Dispense: 14 tablet; Refill: 0         Plan:     ***    MEDICATIONS:   Orders Placed This Encounter   Medications    sulfamethoxazole-trimethoprim (BACTRIM DS) 800-160 MG tablet     Sig: Take 1 tablet by mouth 2 times daily for 7 days.     Dispense:  14 tablet     Refill:  0          - Continue other medications without change  {FURTHER TESTIN}  CONSULTATION/REFERRAL to ***  {FUTURE APPOINTMENTS:611732}  {SELF MONITORIN}  ***  There are no Patient Instructions on file for this visit.    There are no Patient Instructions on file for this visit.    Subjective:       59 year old male presents for evaluation ***    Patient Active Problem List   Diagnosis    HTN (hypertension)    CARDIOVASCULAR SCREENING; LDL GOAL LESS THAN 130    Neoplasm of uncertain behavior of skin    GERD (gastroesophageal reflux disease)       Past Medical History:   Diagnosis Date    GERD (gastroesophageal reflux disease)     Hypertension        Past Surgical History:   Procedure Laterality Date    NO HISTORY OF SURGERY         Current Outpatient Medications   Medication Sig Dispense Refill    cephALEXin (KEFLEX) 500 MG capsule Take 1 capsule (500 mg) by mouth 3 times daily for 10 days. 30 capsule 0    lisinopril (ZESTRIL) 20 MG tablet TAKE 1 TABLET BY MOUTH DAILY 90 tablet 3    multivitamin, therapeutic (THERA-VIT) TABS tablet Take 1 tablet by mouth daily.      omeprazole (PRILOSEC) 20 MG DR capsule Take 1 capsule (20 mg) by mouth as needed. 90 capsule 3    rosuvastatin (CRESTOR) 5 MG tablet Take 1 tablet (5 mg) by mouth daily. 90 tablet 3    sulfamethoxazole-trimethoprim (BACTRIM DS) 800-160 MG tablet Take 1 tablet by mouth 2 times daily for 7 days. 14 tablet 0    vitamin D3 (CHOLECALCIFEROL) 50 mcg (2000 units) tablet Take 1 tablet by mouth daily.      tacrolimus (PROTOPIC) 0.1 % external ointment Apply topically 2 times daily  (Patient not taking: Reported on 4/26/2025) 100 g 5    triamcinolone (KENALOG) 0.1 % external ointment Apply topically 2 times daily (Patient not taking: Reported on 4/26/2025) 454 g 11     No current facility-administered medications for this visit.       No Known Allergies    Family History   Problem Relation Age of Onset    Hypertension Father     Alcohol/Drug Father     Diabetes Father         adult onset    Hypertension Mother     Gastrointestinal Disease Brother         franki    Diabetes Paternal Grandfather        Social History     Socioeconomic History    Marital status:     Number of children: 2   Occupational History    Occupation: Microbiologist     Employer: [employer]   Tobacco Use    Smoking status: Never    Smokeless tobacco: Former     Types: Chew     Quit date: 7/1/2011    Tobacco comments:     sporadic use for many years, quit years ago   Vaping Use    Vaping status: Never Used   Substance and Sexual Activity    Alcohol use: Yes     Comment: one or two drinks, usually wine, 3-4 nights a week    Drug use: No    Sexual activity: Yes     Partners: Female     Birth control/protection: Condom   Other Topics Concern     Service No    Blood Transfusions No    Caffeine Concern No    Occupational Exposure Yes     Comment: works with viruses    Hobby Hazards No    Sleep Concern No    Stress Concern Yes     Comment: does meditation daily, exercises    Weight Concern No    Special Diet No    Back Care No    Exercise Yes     Comment: 3x week 60 min weight training/cardio    Bike Helmet Yes    Seat Belt Yes    Self-Exams Yes    Parent/sibling w/ CABG, MI or angioplasty before 65F 55M? 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  He has an 8 yr. Old son and 5 year old daughter.  Wife is also employed.       Social Drivers of Health     Financial Resource Strain: Low Risk  (12/11/2024)    Financial Resource Strain     Within the past 12 months, have  you or your family members you live with been unable to get utilities (heat, electricity) when it was really needed?: No   Food Insecurity: Low Risk  (12/11/2024)    Food Insecurity     Within the past 12 months, did you worry that your food would run out before you got money to buy more?: No     Within the past 12 months, did the food you bought just not last and you didn t have money to get more?: No   Transportation Needs: Low Risk  (12/11/2024)    Transportation Needs     Within the past 12 months, has lack of transportation kept you from medical appointments, getting your medicines, non-medical meetings or appointments, work, or from getting things that you need?: No   Physical Activity: Sufficiently Active (12/11/2024)    Exercise Vital Sign     Days of Exercise per Week: 4 days     Minutes of Exercise per Session: 70 min   Stress: No Stress Concern Present (12/11/2024)    [employer] - Occupational Stress Questionnaire     Feeling of Stress : Only a little   Social Connections: Unknown (12/11/2024)    Social Connection and Isolation Panel [NHANES]     Frequency of Social Gatherings with Friends and Family: Once a week   Interpersonal Safety: Low Risk  (12/16/2024)    Interpersonal Safety     Do you feel physically and emotionally safe where you currently live?: Yes     Within the past 12 months, have you been hit, slapped, kicked or otherwise physically hurt by someone?: No     Within the past 12 months, have you been humiliated or emotionally abused in other ways by your partner or ex-partner?: No   Housing Stability: Low Risk  (12/11/2024)    Housing Stability     Do you have housing? : Yes     Are you worried about losing your housing?: No         Review of Systems  {ros - complete:55034}      Objective:     BP (!) 152/98 (BP Location: Left arm, Patient Position: Sitting, Cuff Size: Adult Regular)   Pulse 100   Temp 97.9  F (36.6  C) (Tympanic)   Wt 86.2 kg (190 lb)   SpO2  95%   BMI 26.50 kg/m       {exam, complete:07389}       {Diagnostic Test Results (Optional):940297}    This note has been dictated using voice recognition software. Any grammatical or context distortions are unintentional and inherent to the software

## 2025-04-26 NOTE — PROGRESS NOTES
ASSESSMENT:  Sebaceous cyst with abscess formation.  Medication reaction    PLAN:  After informed consent was obtained, using Betadine for cleansing   and 1% Lidocaine  with epinephrine for anesthetic, with sterile   technique, an incision was made into the abscess cavity which was   then drained of purulent material.  The cavity was irrigated and loculations broken up.   Procedure well   tolerated.  Dressing applied and wound care instructions provided.   Recommend frequent tub soaks until abscess resolved.  Return to clinic prn for pain, increased   swelling or fever.    Suspect allergic reaction to cephalexin.  Will have him stop this and start Bactrim instead.        SUBJECTIVE:  59 year old male presents with abscess formation on his back for the past week.  He was started on cephalexin 4 days ago when he came in for a visit.  I&D not done at that time.  He does not feel like its gotten much better and he has since developed a widespread rash on his right side ever since he started the cephalexin.  No   fever or chills.  No history of diabetes.    OBJECTIVE:  Fluctuant abscess noted on the the back.  Size 3 cm there is   surrounding induration, erythema and tenderness. Afebrile.